# Patient Record
Sex: MALE | Race: OTHER | HISPANIC OR LATINO
[De-identification: names, ages, dates, MRNs, and addresses within clinical notes are randomized per-mention and may not be internally consistent; named-entity substitution may affect disease eponyms.]

---

## 2024-03-06 PROBLEM — Z00.00 ENCOUNTER FOR PREVENTIVE HEALTH EXAMINATION: Status: ACTIVE | Noted: 2024-03-06

## 2024-03-08 NOTE — PLAN
[FreeTextEntry1] :   I, Dr. Graves, personally performed the evaluation and management (E/M) services for this new patient.  That E/M includes conducting the initial examination, assessing all conditions, and establishing the plan of care.  Today, my ACP, Beth Beltran, was here to observe my evaluation and management services for this patient to be followed going forward.   Patient and patient's family verbalize agreement and understanding with plan of care.

## 2024-03-11 ENCOUNTER — APPOINTMENT (OUTPATIENT)
Dept: NEUROSURGERY | Facility: CLINIC | Age: 34
End: 2024-03-11
Payer: MEDICAID

## 2024-03-11 VITALS
OXYGEN SATURATION: 98 % | HEIGHT: 63 IN | BODY MASS INDEX: 29.23 KG/M2 | DIASTOLIC BLOOD PRESSURE: 80 MMHG | HEART RATE: 74 BPM | TEMPERATURE: 97.3 F | WEIGHT: 165 LBS | SYSTOLIC BLOOD PRESSURE: 119 MMHG | RESPIRATION RATE: 18 BRPM

## 2024-03-11 LAB
APTT BLD: 29.7 SEC
BASOPHILS # BLD AUTO: 0.03 K/UL
BASOPHILS NFR BLD AUTO: 0.7 %
EOSINOPHIL # BLD AUTO: 0.09 K/UL
EOSINOPHIL NFR BLD AUTO: 2 %
HCT VFR BLD CALC: 45.4 %
HGB BLD-MCNC: 14.7 G/DL
IMM GRANULOCYTES NFR BLD AUTO: 0.2 %
INR PPP: 1.07 RATIO
LYMPHOCYTES # BLD AUTO: 2.27 K/UL
LYMPHOCYTES NFR BLD AUTO: 50.6 %
MAN DIFF?: NORMAL
MCHC RBC-ENTMCNC: 30.6 PG
MCHC RBC-ENTMCNC: 32.4 GM/DL
MCV RBC AUTO: 94.4 FL
MONOCYTES # BLD AUTO: 0.4 K/UL
MONOCYTES NFR BLD AUTO: 8.9 %
NEUTROPHILS # BLD AUTO: 1.69 K/UL
NEUTROPHILS NFR BLD AUTO: 37.6 %
PLATELET # BLD AUTO: 292 K/UL
PT BLD: 12.1 SEC
RBC # BLD: 4.81 M/UL
RBC # FLD: 12 %
WBC # FLD AUTO: 4.49 K/UL

## 2024-03-11 PROCEDURE — 99205 OFFICE O/P NEW HI 60 MIN: CPT

## 2024-03-12 ENCOUNTER — APPOINTMENT (OUTPATIENT)
Dept: OTOLARYNGOLOGY | Facility: CLINIC | Age: 34
End: 2024-03-12
Payer: MEDICAID

## 2024-03-12 VITALS
DIASTOLIC BLOOD PRESSURE: 88 MMHG | SYSTOLIC BLOOD PRESSURE: 143 MMHG | WEIGHT: 153 LBS | HEIGHT: 63 IN | HEART RATE: 83 BPM | BODY MASS INDEX: 27.11 KG/M2

## 2024-03-12 PROCEDURE — 31231 NASAL ENDOSCOPY DX: CPT | Mod: 52

## 2024-03-12 PROCEDURE — 99204 OFFICE O/P NEW MOD 45 MIN: CPT | Mod: 25

## 2024-03-12 RX ORDER — RIZATRIPTAN BENZOATE 10 MG/1
TABLET ORAL
Refills: 0 | Status: ACTIVE | COMMUNITY

## 2024-03-12 RX ORDER — METFORMIN HYDROCHLORIDE 500 MG/1
500 TABLET, COATED ORAL
Refills: 0 | Status: ACTIVE | COMMUNITY

## 2024-03-12 RX ORDER — ATORVASTATIN CALCIUM 10 MG/1
10 TABLET, FILM COATED ORAL
Refills: 0 | Status: ACTIVE | COMMUNITY

## 2024-03-12 RX ORDER — ONDANSETRON 4 MG/1
4 TABLET ORAL
Refills: 0 | Status: ACTIVE | COMMUNITY

## 2024-03-12 RX ORDER — DICLOFENAC SODIUM 1% 10 MG/G
1 GEL TOPICAL
Refills: 0 | Status: ACTIVE | COMMUNITY

## 2024-03-12 NOTE — PHYSICAL EXAM
[Nasal Endoscopy Performed] : nasal endoscopy was performed, see procedure section for findings [Midline] : trachea located in midline position [Normal] : no rashes [FreeTextEntry2] : acromegalic facies

## 2024-03-12 NOTE — PHYSICAL EXAM
[General Appearance - Alert] : alert [General Appearance - In No Acute Distress] : in no acute distress [Oriented To Time, Place, And Person] : oriented to person, place, and time [Abnormal Walk] : normal gait [Balance] : balance was intact [Sclera] : the sclera and conjunctiva were normal

## 2024-03-12 NOTE — ASSESSMENT
[FreeTextEntry1] : 33M referred by Dr. Graves for GH-secreting pituitary adenoma found on MRI in setting of headaches. He has noticed enlargement of his hands/feet and acromegalic features. There is baseline nasal congestion/obstruction. MRI sella shows a right-sided pituitary macroadenoma measuring 1.5 x 1.2 x 1.5 cm which exhibits encroachment of the right cavernous sinus and suprasellar extension. On exam, there are obvious acromegalic features. Nasal endoscopy shows right septal deviation, inferior turbinate hypertrophy and sinonasal mucosal erythema. He has a GH-secreting pituitary adenoma. I had a discussion w the patient about my role in surgical resection, which includes the approach to the sella and reconstruction thereafter. I reviewed risks and benefits and possibility for CSF leak and methods of repair. I also reviewed expected postoperative course. All questions answered and he wishes to proceed as planned. Will get CT sinus preop.

## 2024-03-12 NOTE — ASSESSMENT
[FreeTextEntry1] : MRI brain with and without contrast on 12/12/23 reviewed by Dr. Graves with patient and family member showing pituitary macroadenoma. Discussed need for surgical resection. Will send him for bloodwork today. Will set him up with Dr. Vega tomorrow. Tentative surgery date 4/10/24.  I, Dr. Graves, personally performed the evaluation and management (E/M) services for this new patient.  That E/M includes conducting the initial examination, assessing all conditions, and establishing the plan of care.  Today, my ACP, Beth Beltran, was here to observe my evaluation and management services for this patient to be followed going forward.  Patient and patient's family verbalize agreement and understanding with plan of care.

## 2024-03-12 NOTE — PROCEDURE
[FreeTextEntry3] : Nasal Endoscopy: right septal deviation inferior turbinate hypertrophy sinonasal mucosal erythema

## 2024-03-12 NOTE — DATA REVIEWED
[de-identified] : MRI Sella 12/12/23: FINDINGS:  There is a hypoenhancing lesion within the right aspect of the pituitary gland measuring 1.5 x 1.2 x 1.5 cm (series 8 image 7, series 7 image 7), consistent with pituitary macroadenoma. There is leftward deviation of the pituitary infundibulum. The lesion encroaches upon the right cavernous sinus. There is suprasellar extension without compression of the optic chiasm.  Volumetric FLAIR images of the brain demonstrate a few scattered punctate foci of T2 prolongation in the supratentorial white matter, nonspecific and can be seen in the setting of migraine headaches, chronic microvascular ischemic or inflammatory demyelination. There is nasal turbinate hypertrophy and mild scattered ethmoid air cell mucosal thickening.  IMPRESSION:    Right-sided pituitary macroadenoma measuring 1.5 x 1.2 x 1.5 cm exhibits encroachment of the right cavernous sinus and suprasellar extension, without compression of the optic chiasm.

## 2024-03-12 NOTE — CONSULT LETTER
[Dear  ___] : Dear  [unfilled], [Courtesy Letter:] : I had the pleasure of seeing your patient, [unfilled], in my office today. [Consult Closing:] : Thank you very much for allowing me to participate in the care of this patient.  If you have any questions, please do not hesitate to contact me. [Sincerely,] : Sincerely, [DrMasha  ___] : Dr. LYON [DrMasha ___] : Dr. LYON [FreeTextEntry3] : Darci Vega MD  Department of Otolaryngology, Head and Neck Surgery

## 2024-03-12 NOTE — HISTORY OF PRESENT ILLNESS
[de-identified] : 33M here for initial evaluation.  Referred by Dr. Graves for GH-secreting pituitary adenoma found on MRI in setting of headaches. He has noticed enlargement of his hands/feet and acromegalic features.  He has baseline nasal congestion/obstruction.  MRI Sella 12/12/23: Right-sided pituitary macroadenoma measuring 1.5 x 1.2 x 1.5 cm exhibits encroachment of the right cavernous sinus and suprasellar extension, without compression of the optic chiasm.  ROS otherwise unremarkable.

## 2024-03-12 NOTE — HISTORY OF PRESENT ILLNESS
[de-identified] : Mr. Joseph is a 33 year old male with history of diabetes, HLD presenting with headaches. MRI with and without contrast performed on 12/12/23 showing a 1.5 x 1.2 x 1.5cm right sided pituitary macroadenoma. Presents today with enlargement of his hands/feet and acromegalic features. Having pain that is waking him up. Denies vision changes.

## 2024-03-15 LAB
ACTH-ESO: 69 PG/ML
ALBUMIN SERPL ELPH-MCNC: 5 G/DL
ALP BLD-CCNC: 89 U/L
ALT SERPL-CCNC: 32 U/L
ANION GAP SERPL CALC-SCNC: 12 MMOL/L
AST SERPL-CCNC: 19 U/L
BILIRUB SERPL-MCNC: 0.4 MG/DL
BUN SERPL-MCNC: 13 MG/DL
CALCIUM SERPL-MCNC: 10.4 MG/DL
CHLORIDE SERPL-SCNC: 100 MMOL/L
CO2 SERPL-SCNC: 29 MMOL/L
CORTIS SERPL-MCNC: 19.2 UG/DL
CREAT SERPL-MCNC: 0.94 MG/DL
EGFR: 108 ML/MIN/1.73M2
ESTIMATED AVERAGE GLUCOSE: 163 MG/DL
FSH SERPL-MCNC: 7.5 IU/L
GH SERPL-MCNC: 12.8 NG/ML
GLUCOSE SERPL-MCNC: 139 MG/DL
HBA1C MFR BLD HPLC: 7.3 %
IGF BP1 SERPL-MCNC: 1314 NG/ML
LH SERPL-ACNC: 2.1 IU/L
POTASSIUM SERPL-SCNC: 4.5 MMOL/L
PROLACTIN SERPL-MCNC: 8.1 NG/ML
PROT SERPL-MCNC: 7.9 G/DL
SODIUM SERPL-SCNC: 141 MMOL/L
T3FREE SERPL-MCNC: 3.68 PG/ML
T4 FREE SERPL-MCNC: 1.4 NG/DL
TESTOST SERPL-MCNC: 103 NG/DL
TSH SERPL-ACNC: 2.35 UIU/ML

## 2024-03-21 ENCOUNTER — OUTPATIENT (OUTPATIENT)
Dept: OUTPATIENT SERVICES | Facility: HOSPITAL | Age: 34
LOS: 1 days | End: 2024-03-21
Payer: MEDICAID

## 2024-03-21 ENCOUNTER — APPOINTMENT (OUTPATIENT)
Dept: CT IMAGING | Facility: HOSPITAL | Age: 34
End: 2024-03-21

## 2024-03-21 PROCEDURE — 70486 CT MAXILLOFACIAL W/O DYE: CPT | Mod: 26

## 2024-03-21 PROCEDURE — 70486 CT MAXILLOFACIAL W/O DYE: CPT

## 2024-04-09 ENCOUNTER — TRANSCRIPTION ENCOUNTER (OUTPATIENT)
Age: 34
End: 2024-04-09

## 2024-04-09 RX ORDER — SEMAGLUTIDE 0.68 MG/ML
0.5 INJECTION, SOLUTION SUBCUTANEOUS
Refills: 0 | DISCHARGE

## 2024-04-09 RX ORDER — POVIDONE-IODINE 5 %
1 AEROSOL (ML) TOPICAL ONCE
Refills: 0 | Status: COMPLETED | OUTPATIENT
Start: 2024-04-10 | End: 2024-04-10

## 2024-04-09 RX ORDER — METFORMIN HYDROCHLORIDE 850 MG/1
1 TABLET ORAL
Refills: 0 | DISCHARGE

## 2024-04-09 NOTE — PATIENT PROFILE ADULT - FALL HARM RISK - CONCLUSION
Denies area of redness to skin where it's tender, states lower part of shins are red/\"waxy\" in color but have been that way for years.    Per message yesterday, placed referral to ortho.   Universal Safety Interventions

## 2024-04-09 NOTE — PATIENT PROFILE ADULT - FALL HARM RISK - UNIVERSAL INTERVENTIONS
Bed in lowest position, wheels locked, appropriate side rails in place/Call bell, personal items and telephone in reach/Instruct patient to call for assistance before getting out of bed or chair/Non-slip footwear when patient is out of bed/[place] to call system/Physically safe environment - no spills, clutter or unnecessary equipment/Purposeful Proactive Rounding/Room/bathroom lighting operational, light cord in reach

## 2024-04-09 NOTE — PATIENT PROFILE ADULT - FUNCTIONAL ASSESSMENT - DAILY ACTIVITY ASSESSMENT TYPE
Received a DENIAL from [facility] for eszopiclone (LUNESTA) 1 MG tablet.   Scanned in Epic.    I did receive a call from [facility] and verified w/ Oncology and pharmacy that patient can take 2MG once daily to meet the quantity limits   Admission

## 2024-04-10 ENCOUNTER — APPOINTMENT (OUTPATIENT)
Dept: NEUROSURGERY | Facility: HOSPITAL | Age: 34
End: 2024-04-10

## 2024-04-10 ENCOUNTER — INPATIENT (INPATIENT)
Facility: HOSPITAL | Age: 34
LOS: 2 days | Discharge: HOME | DRG: 829 | End: 2024-04-13
Attending: NEUROLOGICAL SURGERY | Admitting: NEUROLOGICAL SURGERY
Payer: MEDICAID

## 2024-04-10 ENCOUNTER — RESULT REVIEW (OUTPATIENT)
Age: 34
End: 2024-04-10

## 2024-04-10 ENCOUNTER — APPOINTMENT (OUTPATIENT)
Dept: OTOLARYNGOLOGY | Facility: HOSPITAL | Age: 34
End: 2024-04-10

## 2024-04-10 VITALS
TEMPERATURE: 97 F | SYSTOLIC BLOOD PRESSURE: 144 MMHG | RESPIRATION RATE: 16 BRPM | HEART RATE: 85 BPM | DIASTOLIC BLOOD PRESSURE: 81 MMHG | OXYGEN SATURATION: 99 % | WEIGHT: 166.01 LBS | HEIGHT: 63 IN

## 2024-04-10 DIAGNOSIS — E11.65 TYPE 2 DIABETES MELLITUS WITH HYPERGLYCEMIA: ICD-10-CM

## 2024-04-10 DIAGNOSIS — Z79.85 LONG-TERM (CURRENT) USE OF INJECTABLE NON-INSULIN ANTIDIABETIC DRUGS: ICD-10-CM

## 2024-04-10 DIAGNOSIS — D3A.8 OTHER BENIGN NEUROENDOCRINE TUMORS: ICD-10-CM

## 2024-04-10 DIAGNOSIS — Z79.899 OTHER LONG TERM (CURRENT) DRUG THERAPY: ICD-10-CM

## 2024-04-10 DIAGNOSIS — G96.09 OTHER SPINAL CEREBROSPINAL FLUID LEAK: ICD-10-CM

## 2024-04-10 DIAGNOSIS — E22.0 ACROMEGALY AND PITUITARY GIGANTISM: ICD-10-CM

## 2024-04-10 DIAGNOSIS — D35.2 BENIGN NEOPLASM OF PITUITARY GLAND: ICD-10-CM

## 2024-04-10 DIAGNOSIS — K21.9 GASTRO-ESOPHAGEAL REFLUX DISEASE WITHOUT ESOPHAGITIS: ICD-10-CM

## 2024-04-10 DIAGNOSIS — Z79.84 LONG TERM (CURRENT) USE OF ORAL HYPOGLYCEMIC DRUGS: ICD-10-CM

## 2024-04-10 DIAGNOSIS — E78.5 HYPERLIPIDEMIA, UNSPECIFIED: ICD-10-CM

## 2024-04-10 LAB
A1C WITH ESTIMATED AVERAGE GLUCOSE RESULT: 7.9 % — HIGH (ref 4–5.6)
ADD ON TEST-SPECIMEN IN LAB: SIGNIFICANT CHANGE UP
ALBUMIN SERPL ELPH-MCNC: 4.2 G/DL — SIGNIFICANT CHANGE UP (ref 3.3–5)
ALP SERPL-CCNC: 83 U/L — SIGNIFICANT CHANGE UP (ref 40–120)
ALT FLD-CCNC: 17 U/L — SIGNIFICANT CHANGE UP (ref 10–45)
ANION GAP SERPL CALC-SCNC: 7 MMOL/L — SIGNIFICANT CHANGE UP (ref 5–17)
APTT BLD: 29.3 SEC — SIGNIFICANT CHANGE UP (ref 24.5–35.6)
AST SERPL-CCNC: 14 U/L — SIGNIFICANT CHANGE UP (ref 10–40)
BASE EXCESS BLDA CALC-SCNC: -2 MMOL/L — SIGNIFICANT CHANGE UP (ref -2–3)
BASE EXCESS BLDA CALC-SCNC: -2.1 MMOL/L — LOW (ref -2–3)
BASE EXCESS BLDA CALC-SCNC: -2.2 MMOL/L — LOW (ref -2–3)
BILIRUB SERPL-MCNC: 0.2 MG/DL — SIGNIFICANT CHANGE UP (ref 0.2–1.2)
BLD GP AB SCN SERPL QL: NEGATIVE — SIGNIFICANT CHANGE UP
BUN SERPL-MCNC: 12 MG/DL — SIGNIFICANT CHANGE UP (ref 7–23)
CA-I BLDA-SCNC: 1.2 MMOL/L — SIGNIFICANT CHANGE UP (ref 1.15–1.33)
CA-I BLDA-SCNC: 1.21 MMOL/L — SIGNIFICANT CHANGE UP (ref 1.15–1.33)
CA-I BLDA-SCNC: 1.23 MMOL/L — SIGNIFICANT CHANGE UP (ref 1.15–1.33)
CALCIUM SERPL-MCNC: 9.2 MG/DL — SIGNIFICANT CHANGE UP (ref 8.4–10.5)
CHLORIDE SERPL-SCNC: 103 MMOL/L — SIGNIFICANT CHANGE UP (ref 96–108)
CO2 BLDA-SCNC: 24 MMOL/L — SIGNIFICANT CHANGE UP (ref 19–24)
CO2 SERPL-SCNC: 25 MMOL/L — SIGNIFICANT CHANGE UP (ref 22–31)
COHGB MFR BLDA: 1.7 % — SIGNIFICANT CHANGE UP
COHGB MFR BLDA: 1.8 % — SIGNIFICANT CHANGE UP
COHGB MFR BLDA: 1.9 % — SIGNIFICANT CHANGE UP
CREAT SERPL-MCNC: 0.76 MG/DL — SIGNIFICANT CHANGE UP (ref 0.5–1.3)
EGFR: 120 ML/MIN/1.73M2 — SIGNIFICANT CHANGE UP
ESTIMATED AVERAGE GLUCOSE: 180 MG/DL — HIGH (ref 68–114)
GLUCOSE BLDA-MCNC: 295 MG/DL — HIGH (ref 70–99)
GLUCOSE BLDA-MCNC: 320 MG/DL — HIGH (ref 70–99)
GLUCOSE BLDA-MCNC: 345 MG/DL — HIGH (ref 70–99)
GLUCOSE BLDC GLUCOMTR-MCNC: 110 MG/DL — HIGH (ref 70–99)
GLUCOSE BLDC GLUCOMTR-MCNC: 128 MG/DL — HIGH (ref 70–99)
GLUCOSE BLDC GLUCOMTR-MCNC: 128 MG/DL — HIGH (ref 70–99)
GLUCOSE BLDC GLUCOMTR-MCNC: 142 MG/DL — HIGH (ref 70–99)
GLUCOSE BLDC GLUCOMTR-MCNC: 158 MG/DL — HIGH (ref 70–99)
GLUCOSE BLDC GLUCOMTR-MCNC: 159 MG/DL — HIGH (ref 70–99)
GLUCOSE BLDC GLUCOMTR-MCNC: 162 MG/DL — HIGH (ref 70–99)
GLUCOSE BLDC GLUCOMTR-MCNC: 219 MG/DL — HIGH (ref 70–99)
GLUCOSE BLDC GLUCOMTR-MCNC: 219 MG/DL — HIGH (ref 70–99)
GLUCOSE BLDC GLUCOMTR-MCNC: 244 MG/DL — HIGH (ref 70–99)
GLUCOSE BLDC GLUCOMTR-MCNC: 247 MG/DL — HIGH (ref 70–99)
GLUCOSE BLDC GLUCOMTR-MCNC: 300 MG/DL — HIGH (ref 70–99)
GLUCOSE SERPL-MCNC: 310 MG/DL — HIGH (ref 70–99)
HCO3 BLDA-SCNC: 23 MMOL/L — SIGNIFICANT CHANGE UP (ref 21–28)
HCT VFR BLD CALC: 37.3 % — LOW (ref 39–50)
HGB BLD-MCNC: 12.3 G/DL — LOW (ref 13–17)
HGB BLDA-MCNC: 10.4 G/DL — LOW (ref 12.6–17.4)
HGB BLDA-MCNC: 10.7 G/DL — LOW (ref 12.6–17.4)
HGB BLDA-MCNC: 13 G/DL — SIGNIFICANT CHANGE UP (ref 12.6–17.4)
INR BLD: 1.21 — HIGH (ref 0.85–1.18)
MAGNESIUM SERPL-MCNC: 1.8 MG/DL — SIGNIFICANT CHANGE UP (ref 1.6–2.6)
MCHC RBC-ENTMCNC: 30.8 PG — SIGNIFICANT CHANGE UP (ref 27–34)
MCHC RBC-ENTMCNC: 33 GM/DL — SIGNIFICANT CHANGE UP (ref 32–36)
MCV RBC AUTO: 93.3 FL — SIGNIFICANT CHANGE UP (ref 80–100)
METHGB MFR BLDA: 0.6 % — SIGNIFICANT CHANGE UP
METHGB MFR BLDA: 0.7 % — SIGNIFICANT CHANGE UP
METHGB MFR BLDA: 0.8 % — SIGNIFICANT CHANGE UP
NRBC # BLD: 0 /100 WBCS — SIGNIFICANT CHANGE UP (ref 0–0)
OXYHGB MFR BLDA: 96.7 % — HIGH (ref 90–95)
OXYHGB MFR BLDA: 96.8 % — HIGH (ref 90–95)
OXYHGB MFR BLDA: 97.7 % — HIGH (ref 90–95)
PCO2 BLDA: 40 MMHG — SIGNIFICANT CHANGE UP (ref 35–48)
PCO2 BLDA: 41 MMHG — SIGNIFICANT CHANGE UP (ref 35–48)
PCO2 BLDA: 41 MMHG — SIGNIFICANT CHANGE UP (ref 35–48)
PH BLDA: 7.36 — SIGNIFICANT CHANGE UP (ref 7.35–7.45)
PH BLDA: 7.36 — SIGNIFICANT CHANGE UP (ref 7.35–7.45)
PH BLDA: 7.37 — SIGNIFICANT CHANGE UP (ref 7.35–7.45)
PHOSPHATE SERPL-MCNC: 3.8 MG/DL — SIGNIFICANT CHANGE UP (ref 2.5–4.5)
PLATELET # BLD AUTO: 258 K/UL — SIGNIFICANT CHANGE UP (ref 150–400)
PO2 BLDA: 101 MMHG — SIGNIFICANT CHANGE UP (ref 83–108)
PO2 BLDA: 106 MMHG — SIGNIFICANT CHANGE UP (ref 83–108)
PO2 BLDA: 293 MMHG — HIGH (ref 83–108)
POTASSIUM BLDA-SCNC: 3.8 MMOL/L — SIGNIFICANT CHANGE UP (ref 3.5–5.1)
POTASSIUM BLDA-SCNC: 4 MMOL/L — SIGNIFICANT CHANGE UP (ref 3.5–5.1)
POTASSIUM BLDA-SCNC: 4.2 MMOL/L — SIGNIFICANT CHANGE UP (ref 3.5–5.1)
POTASSIUM SERPL-MCNC: 4.5 MMOL/L — SIGNIFICANT CHANGE UP (ref 3.5–5.3)
POTASSIUM SERPL-SCNC: 4.5 MMOL/L — SIGNIFICANT CHANGE UP (ref 3.5–5.3)
PROT SERPL-MCNC: 6.8 G/DL — SIGNIFICANT CHANGE UP (ref 6–8.3)
PROTHROM AB SERPL-ACNC: 13.7 SEC — HIGH (ref 9.5–13)
RBC # BLD: 4 M/UL — LOW (ref 4.2–5.8)
RBC # FLD: 11.5 % — SIGNIFICANT CHANGE UP (ref 10.3–14.5)
RH IG SCN BLD-IMP: POSITIVE — SIGNIFICANT CHANGE UP
SAO2 % BLDA: 100 % — HIGH (ref 94–98)
SAO2 % BLDA: 99.3 % — HIGH (ref 94–98)
SAO2 % BLDA: 99.4 % — HIGH (ref 94–98)
SODIUM BLDA-SCNC: 133 MMOL/L — LOW (ref 136–145)
SODIUM BLDA-SCNC: 134 MMOL/L — LOW (ref 136–145)
SODIUM BLDA-SCNC: 135 MMOL/L — LOW (ref 136–145)
SODIUM SERPL-SCNC: 135 MMOL/L — SIGNIFICANT CHANGE UP (ref 135–145)
WBC # BLD: 5.56 K/UL — SIGNIFICANT CHANGE UP (ref 3.8–10.5)
WBC # FLD AUTO: 5.56 K/UL — SIGNIFICANT CHANGE UP (ref 3.8–10.5)

## 2024-04-10 PROCEDURE — 88341 IMHCHEM/IMCYTCHM EA ADD ANTB: CPT | Mod: 26

## 2024-04-10 PROCEDURE — 61782 SCAN PROC CRANIAL EXTRA: CPT

## 2024-04-10 PROCEDURE — 88360 TUMOR IMMUNOHISTOCHEM/MANUAL: CPT | Mod: 26,1L,59

## 2024-04-10 PROCEDURE — 70552 MRI BRAIN STEM W/DYE: CPT | Mod: 26

## 2024-04-10 PROCEDURE — 99291 CRITICAL CARE FIRST HOUR: CPT

## 2024-04-10 PROCEDURE — 62165 REMOVE PITUIT TUMOR W/SCOPE: CPT | Mod: 62

## 2024-04-10 PROCEDURE — 88342 IMHCHEM/IMCYTCHM 1ST ANTB: CPT | Mod: 26

## 2024-04-10 PROCEDURE — 61781 SCAN PROC CRANIAL INTRA: CPT

## 2024-04-10 PROCEDURE — 88331 PATH CONSLTJ SURG 1 BLK 1SPC: CPT | Mod: 26

## 2024-04-10 PROCEDURE — 88305 TISSUE EXAM BY PATHOLOGIST: CPT | Mod: 26

## 2024-04-10 PROCEDURE — 88334 PATH CONSLTJ SURG CYTO XM EA: CPT | Mod: 26,59

## 2024-04-10 PROCEDURE — 15769 GRFG AUTOL SOFT TISS DIR EXC: CPT

## 2024-04-10 DEVICE — MATRIX DURAGEN PLUS DURAL REGENERATION 3X3: Type: IMPLANTABLE DEVICE | Status: FUNCTIONAL

## 2024-04-10 DEVICE — DVC ADHERUS AUTOSPRAY W/ DURAL SEALANT EXT TIP: Type: IMPLANTABLE DEVICE | Status: FUNCTIONAL

## 2024-04-10 DEVICE — SURGCEL 4 X 8": Type: IMPLANTABLE DEVICE | Status: FUNCTIONAL

## 2024-04-10 DEVICE — SURGIFLO HEMOSTATIC MATRIX KIT: Type: IMPLANTABLE DEVICE | Status: FUNCTIONAL

## 2024-04-10 DEVICE — SURGIFOAM PAD 8CM X 12.5CM X 10MM (100): Type: IMPLANTABLE DEVICE | Status: FUNCTIONAL

## 2024-04-10 RX ORDER — PANTOPRAZOLE SODIUM 20 MG/1
40 TABLET, DELAYED RELEASE ORAL
Refills: 0 | Status: DISCONTINUED | OUTPATIENT
Start: 2024-04-10 | End: 2024-04-13

## 2024-04-10 RX ORDER — HYDROCORTISONE 20 MG
50 TABLET ORAL EVERY 8 HOURS
Refills: 0 | Status: COMPLETED | OUTPATIENT
Start: 2024-04-10 | End: 2024-04-11

## 2024-04-10 RX ORDER — POLYETHYLENE GLYCOL 3350 17 G/17G
17 POWDER, FOR SOLUTION ORAL DAILY
Refills: 0 | Status: DISCONTINUED | OUTPATIENT
Start: 2024-04-10 | End: 2024-04-13

## 2024-04-10 RX ORDER — MAGNESIUM SULFATE 500 MG/ML
2 VIAL (ML) INJECTION ONCE
Refills: 0 | Status: COMPLETED | OUTPATIENT
Start: 2024-04-10 | End: 2024-04-10

## 2024-04-10 RX ORDER — INSULIN LISPRO 100/ML
VIAL (ML) SUBCUTANEOUS
Refills: 0 | Status: DISCONTINUED | OUTPATIENT
Start: 2024-04-10 | End: 2024-04-11

## 2024-04-10 RX ORDER — CEFTRIAXONE 500 MG/1
1000 INJECTION, POWDER, FOR SOLUTION INTRAMUSCULAR; INTRAVENOUS EVERY 24 HOURS
Refills: 0 | Status: DISCONTINUED | OUTPATIENT
Start: 2024-04-10 | End: 2024-04-11

## 2024-04-10 RX ORDER — CYCLOBENZAPRINE HYDROCHLORIDE 10 MG/1
1 TABLET, FILM COATED ORAL
Refills: 0 | DISCHARGE

## 2024-04-10 RX ORDER — OXYCODONE HYDROCHLORIDE 5 MG/1
5 TABLET ORAL EVERY 4 HOURS
Refills: 0 | Status: DISCONTINUED | OUTPATIENT
Start: 2024-04-10 | End: 2024-04-13

## 2024-04-10 RX ORDER — RACEPINEPHRINE HCL 2.25 %
1 SOLUTION, NON-ORAL TOPICAL
Refills: 0 | Status: DISCONTINUED | OUTPATIENT
Start: 2024-04-10 | End: 2024-04-10

## 2024-04-10 RX ORDER — ONDANSETRON 8 MG/1
4 TABLET, FILM COATED ORAL EVERY 6 HOURS
Refills: 0 | Status: COMPLETED | OUTPATIENT
Start: 2024-04-10 | End: 2024-04-12

## 2024-04-10 RX ORDER — INSULIN LISPRO 100/ML
5 VIAL (ML) SUBCUTANEOUS
Refills: 0 | Status: DISCONTINUED | OUTPATIENT
Start: 2024-04-11 | End: 2024-04-11

## 2024-04-10 RX ORDER — PANTOPRAZOLE SODIUM 20 MG/1
40 TABLET, DELAYED RELEASE ORAL
Refills: 0 | Status: DISCONTINUED | OUTPATIENT
Start: 2024-04-10 | End: 2024-04-10

## 2024-04-10 RX ORDER — DEXTROSE 50 % IN WATER 50 %
25 SYRINGE (ML) INTRAVENOUS ONCE
Refills: 0 | Status: DISCONTINUED | OUTPATIENT
Start: 2024-04-10 | End: 2024-04-11

## 2024-04-10 RX ORDER — OXYCODONE HYDROCHLORIDE 5 MG/1
10 TABLET ORAL EVERY 4 HOURS
Refills: 0 | Status: DISCONTINUED | OUTPATIENT
Start: 2024-04-10 | End: 2024-04-13

## 2024-04-10 RX ORDER — SENNA PLUS 8.6 MG/1
2 TABLET ORAL AT BEDTIME
Refills: 0 | Status: DISCONTINUED | OUTPATIENT
Start: 2024-04-10 | End: 2024-04-13

## 2024-04-10 RX ORDER — DEXTROSE 50 % IN WATER 50 %
12.5 SYRINGE (ML) INTRAVENOUS ONCE
Refills: 0 | Status: DISCONTINUED | OUTPATIENT
Start: 2024-04-10 | End: 2024-04-11

## 2024-04-10 RX ORDER — SODIUM CHLORIDE 9 MG/ML
1000 INJECTION INTRAMUSCULAR; INTRAVENOUS; SUBCUTANEOUS
Refills: 0 | Status: DISCONTINUED | OUTPATIENT
Start: 2024-04-10 | End: 2024-04-10

## 2024-04-10 RX ORDER — INSULIN HUMAN 100 [IU]/ML
6 INJECTION, SOLUTION SUBCUTANEOUS
Qty: 100 | Refills: 0 | Status: DISCONTINUED | OUTPATIENT
Start: 2024-04-10 | End: 2024-04-11

## 2024-04-10 RX ORDER — ACETAMINOPHEN 500 MG
1000 TABLET ORAL ONCE
Refills: 0 | Status: COMPLETED | OUTPATIENT
Start: 2024-04-10 | End: 2024-04-10

## 2024-04-10 RX ORDER — INSULIN GLARGINE 100 [IU]/ML
15 INJECTION, SOLUTION SUBCUTANEOUS AT BEDTIME
Refills: 0 | Status: DISCONTINUED | OUTPATIENT
Start: 2024-04-10 | End: 2024-04-13

## 2024-04-10 RX ORDER — ATORVASTATIN CALCIUM 80 MG/1
10 TABLET, FILM COATED ORAL AT BEDTIME
Refills: 0 | Status: DISCONTINUED | OUTPATIENT
Start: 2024-04-10 | End: 2024-04-13

## 2024-04-10 RX ORDER — FLUTICASONE PROPIONATE 50 MCG
1 SPRAY, SUSPENSION NASAL
Refills: 0 | DISCHARGE

## 2024-04-10 RX ORDER — ACETAMINOPHEN 500 MG
2 TABLET ORAL
Refills: 0 | DISCHARGE

## 2024-04-10 RX ORDER — SODIUM CHLORIDE 9 MG/ML
1000 INJECTION, SOLUTION INTRAVENOUS
Refills: 0 | Status: DISCONTINUED | OUTPATIENT
Start: 2024-04-10 | End: 2024-04-10

## 2024-04-10 RX ORDER — BENZOCAINE AND MENTHOL 5; 1 G/100ML; G/100ML
1 LIQUID ORAL EVERY 6 HOURS
Refills: 0 | Status: DISCONTINUED | OUTPATIENT
Start: 2024-04-10 | End: 2024-04-13

## 2024-04-10 RX ORDER — SODIUM CHLORIDE 0.65 %
1 AEROSOL, SPRAY (ML) NASAL DAILY
Refills: 0 | Status: DISCONTINUED | OUTPATIENT
Start: 2024-04-11 | End: 2024-04-11

## 2024-04-10 RX ORDER — ACETAMINOPHEN 500 MG
1000 TABLET ORAL EVERY 8 HOURS
Refills: 0 | Status: DISCONTINUED | OUTPATIENT
Start: 2024-04-10 | End: 2024-04-12

## 2024-04-10 RX ORDER — OMEPRAZOLE 10 MG/1
1 CAPSULE, DELAYED RELEASE ORAL
Refills: 0 | DISCHARGE

## 2024-04-10 RX ORDER — CHLORHEXIDINE GLUCONATE 213 G/1000ML
1 SOLUTION TOPICAL EVERY 12 HOURS
Refills: 0 | Status: DISCONTINUED | OUTPATIENT
Start: 2024-04-10 | End: 2024-04-10

## 2024-04-10 RX ORDER — ATORVASTATIN CALCIUM 80 MG/1
1 TABLET, FILM COATED ORAL
Refills: 0 | DISCHARGE

## 2024-04-10 RX ADMIN — Medication 25 GRAM(S): at 13:31

## 2024-04-10 RX ADMIN — CHLORHEXIDINE GLUCONATE 1 APPLICATION(S): 213 SOLUTION TOPICAL at 06:31

## 2024-04-10 RX ADMIN — ONDANSETRON 4 MILLIGRAM(S): 8 TABLET, FILM COATED ORAL at 17:13

## 2024-04-10 RX ADMIN — Medication 1 APPLICATION(S): at 07:08

## 2024-04-10 RX ADMIN — Medication 50 MILLIGRAM(S): at 16:32

## 2024-04-10 RX ADMIN — Medication 50 MILLIGRAM(S): at 22:26

## 2024-04-10 RX ADMIN — Medication 1000 MILLIGRAM(S): at 22:25

## 2024-04-10 RX ADMIN — Medication 1000 MILLIGRAM(S): at 07:07

## 2024-04-10 RX ADMIN — SODIUM CHLORIDE 40 MILLILITER(S): 9 INJECTION, SOLUTION INTRAVENOUS at 18:44

## 2024-04-10 RX ADMIN — ATORVASTATIN CALCIUM 10 MILLIGRAM(S): 80 TABLET, FILM COATED ORAL at 22:27

## 2024-04-10 RX ADMIN — BENZOCAINE AND MENTHOL 1 LOZENGE: 5; 1 LIQUID ORAL at 21:05

## 2024-04-10 RX ADMIN — SENNA PLUS 2 TABLET(S): 8.6 TABLET ORAL at 22:26

## 2024-04-10 RX ADMIN — Medication 1000 MILLIGRAM(S): at 13:51

## 2024-04-10 RX ADMIN — CEFTRIAXONE 100 MILLIGRAM(S): 500 INJECTION, POWDER, FOR SOLUTION INTRAMUSCULAR; INTRAVENOUS at 12:26

## 2024-04-10 RX ADMIN — Medication 400 MILLIGRAM(S): at 13:07

## 2024-04-10 RX ADMIN — INSULIN HUMAN 6 UNIT(S)/HR: 100 INJECTION, SOLUTION SUBCUTANEOUS at 13:07

## 2024-04-10 RX ADMIN — Medication 2: at 22:24

## 2024-04-10 RX ADMIN — Medication 1000 MILLIGRAM(S): at 23:40

## 2024-04-10 RX ADMIN — INSULIN GLARGINE 15 UNIT(S): 100 INJECTION, SOLUTION SUBCUTANEOUS at 22:25

## 2024-04-10 NOTE — CONSULT NOTE ADULT - SUBJECTIVE AND OBJECTIVE BOX
HISTORY OF PRESENT ILLNESS  ZIA GIBBS is a 33y Male with a past medical history of       IGFT-1 1314, ACTH 69, GH 12.80, TSH 2.35, FSH 7.5, prolactin 8.1, LH 2.1. total testosterone 103, cortisol PM 19.2, a1c 7.1    MRI with and without contrast performed on 12/12/23 showing a 1.5 x 1.2 x 1.5cm right sided pituitary macroadenoma.    Endocrinology has been consulted for postoperative care of pituitary surgery    DIABETES HISTORY  - Age at diagnosis:   - Diabetes managed outpatient by:  - Current Therapy:  - History of other regimens:   - History of hypoglycemia:   - History of DKA/HHS:   - Diabetic Complications:   - Home glucose readings:  - Diet:          > Breakfast:         > Lunch:        > Dinner:        > Snacks:    FAMILY HISTORY  - Diabetes:    SOCIAL HISTORY  - Work:  - Alcohol:  - Smoking:    ALLERGIES  No Known Allergies      CURRENT MEDICATIONS  acetaminophen     Tablet .. 1000 milliGRAM(s) Oral every 8 hours  atorvastatin 10 milliGRAM(s) Oral at bedtime  cefTRIAXone   IVPB 1000 milliGRAM(s) IV Intermittent every 24 hours  dextrose 50% Injectable 12.5 Gram(s) IV Push once  dextrose 50% Injectable 25 Gram(s) IV Push once  EPINEPHrine   1 mG/mL (1:1,000) Topical Solution 1 Application(s) Topical <User Schedule>  insulin regular Infusion 6 Unit(s)/Hr IV Continuous <Continuous>  magnesium sulfate  IVPB 2 Gram(s) IV Intermittent once  ondansetron Injectable 4 milliGRAM(s) IV Push every 6 hours  oxyCODONE    IR 5 milliGRAM(s) Oral every 4 hours PRN  oxyCODONE    IR 10 milliGRAM(s) Oral every 4 hours PRN  pantoprazole    Tablet 40 milliGRAM(s) Oral before breakfast  polyethylene glycol 3350 17 Gram(s) Oral daily  senna 2 Tablet(s) Oral at bedtime  sodium chloride 0.9%. 1000 milliLiter(s) IV Continuous <Continuous>      REVIEW OF SYSTEMS  Constitutional:  Negative fever, chills   Cardiovascular:  Negative for chest pain or palpitations.  Respiratory:  Negative for cough, wheezing, or shortness of breath.   Gastrointestinal:  Negative for nausea, vomiting, diarrhea, constipation, or abdominal pain.  Genitourinary:  Negative frequency, urgency or dysuria.  Neurologic:  No headache, confusion, dizziness    PHYSICAL EXAM  Vital Signs Last 24 Hrs  T(C): 37.1 (10 Apr 2024 12:00), Max: 37.1 (10 Apr 2024 12:00)  T(F): 98.8 (10 Apr 2024 12:00), Max: 98.8 (10 Apr 2024 12:00)  HR: 104 (10 Apr 2024 13:00) (85 - 104)  BP: 146/67 (10 Apr 2024 13:00) (142/71 - 150/68)  BP(mean): 96 (10 Apr 2024 13:00) (95 - 99)  RR: 14 (10 Apr 2024 12:30) (12 - 16)  SpO2: 97% (10 Apr 2024 13:00) (92% - 99%)    Parameters below as of 10 Apr 2024 13:00      O2 Concentration (%): 40  Constitutional: Awake, alert  HEENT: Normocephalic, atraumatic, AXEL  Neck: supple, no acanthosis, no thyromegaly or palpable thyroid nodules.  Respiratory: Lungs clear to ausculation bilaterally.   Cardiovascular: regular rhythm, normal S1 and S2, no audible murmurs.   GI: soft, non-tender, non-distended, bowel sounds present  Extremities: No lower extremity edema, peripheral pulses present.     LABS  CBC - WBC/HGB/HTC/PLT: 5.56/12.3/37.3/258 (04-10-24)  BMP: Na/K/Cl/Bicarb/BUN/Cr/Gluc: 135/4.5/103/25/12/0.76/310 (04-10-24)  Anion Gap: 7 (04-10-24)  eGFR: 120 (04-10-24)  Calcium: 9.2 (04-10-24)  Phosphorus: 3.8 (04-10-24)  Magnesium: 1.8 (04-10-24)  LFT - Alb/Tprot/Tbili/Dbili/AlkPhos/ALT/AST: 4.2/--/0.2/--/83/17/14 (04-10-24)  PT/aPTT/INR: 13.7/29.3/1.21 (04-10-24)    CBC - WBC/HGB/HTC/PLT: 5.56/12.3/37.3/258 (04-10-24)BMP: Na/K/Cl/Bicarb/BUN/Cr/Gluc: 135/4.5/103/25/12/0.76/310 (04-10-24)  Anion Gap: 7 (04-10-24)  eGFR: 120 (04-10-24)  Calcium: 9.2 (04-10-24)  Phosphorus: 3.8 (04-10-24)  Magnesium: 1.8 (04-10-24)    CAPILLARY BLOOD GLUCOSE & INSULIN RECEIVED  247 mg/dL (04-10 @ 06:51)  300 mg/dL (04-10 @ 11:49)        04-10-24 @ 07:01  -  04-10-24 @ 13:18  --------------------------------------------------------  IN: 150 mL / OUT: 275 mL / NET: -125 mL        ASSESSMENT / RECOMMENDATIONS    A1C: BUN: 12  Creatinine: 0.76  GFR: 120  Weight: 75.3  BMI: 29.4  EF:     # pituitary macroadenoma  s/p resection  - strict Is/Os  - monitor s/s of DI, if the urine output is increasing >150-200ml/hr for consecutive 2-3 hours and Na level >145, consider 1x DDAVP 0.25 mcg  - steroid taper per neurosurgery  - recheck hormone tomorrow    # Type 2 diabetes mellitus with hyperglycemia  - Please keep lantus   units at bedtime.   - Keep lispro   units before each meal.  - Continue lispro moderate dose sliding scale before meals and at bedtime.  - Patient's fingerstick glucose goal is 100-180 mg/dL.    - Discharge recommendations to be discussed.   - Patient can follow up at discharge with [facility] by calling [phone] to make an appointment.      Case discussed with Dr. Wallace. Primary team updated.       Ammy Rivas  Endocrinology Fellow    Service Pager: [phone]  HISTORY OF PRESENT ILLNESS  ZIA GIBBS is a 33y Male with a past medical history of acromegaly, type 2 DM is now s/p Endoscopic transphenoidal or transnasal resection of pituitary tumor on 04/10.    Pt was still drowsy during consultation from anesthesia.  Wife and friend by bedside who I obtained history from.  From 2022, pt was noted to have increasingly growing digits, width of nose, toes and teeth were enlarging.  He was also having persistent headaches.   Pt reportedly never saw endocrinologist.  He saw neurologist and found to have elevated hormones there were consistent with acromegaly.  Most recent labs showed IGFT-1 1314, ACTH 69, GH 12.80, TSH 2.35, FSH 7.5, prolactin 8.1, LH 2.1. total testosterone 103, cortisol PM 19.2, a1c 7.1    Most recent MRI with and without contrast performed on 12/12/23 showing a 1.5 x 1.2 x 1.5cm right sided pituitary macroadenoma.    Endocrinology has been consulted for postoperative care of pituitary surgery    DIABETES HISTORY  Pt takes metformin 500 bid, denies family history of diabetes.  Per family, the diet is healthy with minimal sweets and no soda drinking.  Wife said the sugars are mostly under 150s.  Managed by PCP    SOCIAL HISTORY  - Work:     ALLERGIES  No Known Allergies      CURRENT MEDICATIONS  acetaminophen     Tablet .. 1000 milliGRAM(s) Oral every 8 hours  atorvastatin 10 milliGRAM(s) Oral at bedtime  cefTRIAXone   IVPB 1000 milliGRAM(s) IV Intermittent every 24 hours  dextrose 50% Injectable 12.5 Gram(s) IV Push once  dextrose 50% Injectable 25 Gram(s) IV Push once  EPINEPHrine   1 mG/mL (1:1,000) Topical Solution 1 Application(s) Topical <User Schedule>  insulin regular Infusion 6 Unit(s)/Hr IV Continuous <Continuous>  magnesium sulfate  IVPB 2 Gram(s) IV Intermittent once  ondansetron Injectable 4 milliGRAM(s) IV Push every 6 hours  oxyCODONE    IR 5 milliGRAM(s) Oral every 4 hours PRN  oxyCODONE    IR 10 milliGRAM(s) Oral every 4 hours PRN  pantoprazole    Tablet 40 milliGRAM(s) Oral before breakfast  polyethylene glycol 3350 17 Gram(s) Oral daily  senna 2 Tablet(s) Oral at bedtime  sodium chloride 0.9%. 1000 milliLiter(s) IV Continuous <Continuous>      REVIEW OF SYSTEMS  limited    PHYSICAL EXAM  Vital Signs Last 24 Hrs  T(C): 37.1 (10 Apr 2024 12:00), Max: 37.1 (10 Apr 2024 12:00)  T(F): 98.8 (10 Apr 2024 12:00), Max: 98.8 (10 Apr 2024 12:00)  HR: 104 (10 Apr 2024 13:00) (85 - 104)  BP: 146/67 (10 Apr 2024 13:00) (142/71 - 150/68)  BP(mean): 96 (10 Apr 2024 13:00) (95 - 99)  RR: 14 (10 Apr 2024 12:30) (12 - 16)  SpO2: 97% (10 Apr 2024 13:00) (92% - 99%)    Parameters below as of 10 Apr 2024 13:00      O2 Concentration (%): 40  Constitutional: appeared drowsy  HEENT: Normocephalic, atraumatic, AXEL  Neck: supple, no acanthosis, no thyromegaly or palpable thyroid nodules.  Respiratory: Lungs clear to ausculation bilaterally.   Cardiovascular: regular rhythm, normal S1 and S2, no audible murmurs.   GI: soft, non-tender, non-distended, bowel sounds present  Extremities: No lower extremity edema, peripheral pulses present.     LABS  CBC - WBC/HGB/HTC/PLT: 5.56/12.3/37.3/258 (04-10-24)  BMP: Na/K/Cl/Bicarb/BUN/Cr/Gluc: 135/4.5/103/25/12/0.76/310 (04-10-24)  Anion Gap: 7 (04-10-24)  eGFR: 120 (04-10-24)  Calcium: 9.2 (04-10-24)  Phosphorus: 3.8 (04-10-24)  Magnesium: 1.8 (04-10-24)  LFT - Alb/Tprot/Tbili/Dbili/AlkPhos/ALT/AST: 4.2/--/0.2/--/83/17/14 (04-10-24)  PT/aPTT/INR: 13.7/29.3/1.21 (04-10-24)    CBC - WBC/HGB/HTC/PLT: 5.56/12.3/37.3/258 (04-10-24)BMP: Na/K/Cl/Bicarb/BUN/Cr/Gluc: 135/4.5/103/25/12/0.76/310 (04-10-24)  Anion Gap: 7 (04-10-24)  eGFR: 120 (04-10-24)  Calcium: 9.2 (04-10-24)  Phosphorus: 3.8 (04-10-24)  Magnesium: 1.8 (04-10-24)    CAPILLARY BLOOD GLUCOSE & INSULIN RECEIVED  247 mg/dL (04-10 @ 06:51)  300 mg/dL (04-10 @ 11:49)        04-10-24 @ 07:01  -  04-10-24 @ 13:18  --------------------------------------------------------  IN: 150 mL / OUT: 275 mL / NET: -125 mL        ASSESSMENT / RECOMMENDATIONS    ZIA GIBBS is a 33y Male with a past medical history of acromegaly, type 2 DM is now s/p Endoscopic transphenoidal or transnasal resection of pituitary tumor on 04/10.      A1C: BUN: 12  Creatinine: 0.76  GFR: 120  Weight: 75.3  BMI: 29.4  EF:     # pituitary macroadenoma  s/p resection  - strict Is/Os  - monitor s/s of DI, if the urine output is increasing >150-200ml/hr for consecutive 2-3 hours and Na level >145, consider 1x DDAVP 0.25 mcg  - would recommend steroid taper to IV hydrocortisone 50 q8 today   - will recheck hormones (IGF-1, GH, TSH, free T4, total testosterone) tomorrow    # Type 2 diabetes mellitus with hyperglycemia  - most a1c 7.1  - Please keep lantus 15   units at bedtime.   - Keep lispro  5  units before each meal.  - Continue lispro moderate dose sliding scale before meals and at bedtime.  - Patient's fingerstick glucose goal is 100-180 mg/dL.    - Discharge recommendations to be discussed.   - Patient can follow up at discharge with [facility] by calling [phone] to make an appointment.      Case discussed with Dr. Wallace. Primary team updated.       Ammy Rivas  Endocrinology Fellow    Service Pager: [phone]

## 2024-04-10 NOTE — PRE-OP CHECKLIST - PATIENT'S PERSONAL PROPERTY GIVEN TO
Caller: Addie Yates    Relationship: Self    Best call back number: [phone]    Requested Prescriptions:   Requested Prescriptions     Pending Prescriptions Disp Refills    meloxicam (MOBIC) 15 MG tablet       Sig: Take 1 tablet by mouth Daily.        Pharmacy where request should be sent: [facility] [place], [place] - [address] [facility] [address] - [phone]  - [phone] FX     Last office visit with prescribing clinician: 12/7/2023   Last telemedicine visit with prescribing clinician: Visit date not found   Next office visit with prescribing clinician: 3/12/2024     Additional details provided by patient:     Does the patient have less than a 3 day supply:  [x] Yes  [] No    Would you like a call back once the refill request has been completed: [] Yes [] No    If the office needs to give you a call back, can they leave a voicemail: [] Yes [] No    Gomez Gant Rep   02/26/24 12:26 EST          family member/on unit

## 2024-04-10 NOTE — H&P ADULT - ASSESSMENT
Mr. Joseph is a 33 year old male with history of diabetes, HLD presenting with headaches. MRI with and without contrast performed on 12/12/23 showing a 1.5 x 1.2 x 1.5cm right sided pituitary macroadenoma. Presents today with enlargement of his hands/feet and acromegalic features. Having pain that is waking him up. Denies vision changes. Patient is here now for endoscopic endonasal resection of pituitary mass.    -ICU for post-op care

## 2024-04-10 NOTE — BRIEF OPERATIVE NOTE - NSICDXBRIEFPOSTOP_GEN_ALL_CORE_FT
POST-OP DIAGNOSIS:  Pituitary neoplasm 10-Apr-2024 11:53:16  Renee Marcial  
POST-OP DIAGNOSIS:  Pituitary neoplasm 10-Apr-2024 11:53:16  Renee Marcial

## 2024-04-10 NOTE — PROGRESS NOTE ADULT - ASSESSMENT
33y/M with  s/p TSP resection   Diabetes Mellitus dyslipidemia  GERD  hay fever    PLAN:   NEURO:  REHAB:  physical therapy evaluation and management    EARLY MOB:      PULM:  Room air, incentive spirometry  CARDIO:  SBP goal 100-150mm Hg  ENDO:  Blood sugar goals 140-180 mg/dL, continue insulin sliding scale  GI:  PPI for GI prophylaxis  DIET:  RENAL:    HEM/ONC:  VTE Prophylaxis:     ID: afebrile, no leukocytosis  ====================   T(F): , Max: 98.8 (04-10-24 @ 12:00)    WBC Count: 5.56 (04-10)    ====================  Social: will update family    Active issues:  What's keeping patient in the ICU?  What is this patient's dispo plan?    ATTENDING ATTESTATION:  I was physically present for the key portions of the evaluation and management (E/M) service provided.  I agree with the above history, physical and plan, which I have reviewed and edited where appropriate.    Patient at high risk for neurological deterioration or death due to:  ICU delirium, aspiration PNA, DVT / PE.  Critical care time:  I have personally provided 45 minutes of critical care time, excluding time spent on separate procedures.      Plan discussed with RN, house staff. 33y/M with  acromegaly, R pituitary macroadenoma, s/p TSP resection (04/10/2024, Dr. Graves)  Diabetes Mellitus dyslipidemia  GERD  hay fever    PLAN:   NEURO: neurochecks q1h, PRN pain meds with oxycodone  WOF CSF leak; f/u histopathology, MRI out patient, steroid taper   bilateral Franco until outpatient f/u   DI watch  REHAB:  physical therapy evaluation and management    EARLY MOB:      PULM:  Room air, skull base precautions: no nose blowing, drinking with a straw, or bending below waist; NO incentive spirometry   CARDIO:  SBP goal 100-140mm Hg  ENDO:  Blood sugar goals 140-180 mg/dL, continue insulin sliding scale; atorvastatin 10; insulin glargine 15 units tonight, d/c insulin drip after 2 hours and D5 NS fluids; start lispro 5 units premeals tomorrow; hydrocortisone taper  GI:  PPI for GERD   DIET: CCD  RENAL:  IVL once off insulin drip, d/c Gandhi, if UO >250cc/hr x2 hours, send a DI work-up  HEM/ONC: Hb stable  VTE Prophylaxis: SCDs, no DVT chemoprophylaxis for now as patient is high risk for bleed (fresh post-op)  ID: afebrile, no leukocytosis; ceftriaxone while Franco splints  Social: will update family    Active issues:  What's keeping patient in the ICU? close neurochecks, DI watch, insulin drip  What is this patient's dispo plan? stepdown tomorrow, off insulin drip    ATTENDING ATTESTATION:  I was physically present for the key portions of the evaluation and management (E/M) service provided.  I agree with the above history, physical and plan, which I have reviewed and edited where appropriate.    Patient at high risk for neurological deterioration or death due to:  ICU delirium, aspiration PNA, DVT / PE.  Critical care time:  I have personally provided 45 minutes of critical care time, excluding time spent on separate procedures.      Plan discussed with RN, house staff.

## 2024-04-10 NOTE — BRIEF OPERATIVE NOTE - OPERATION/FINDINGS
Transphenoidal resection of pituitary adenoma. Including R middle turbinate resection and septectomy. Low flow CSF leak intraoperatively closed with gel foam, surgicel, middle turbinate mucosal graft, and adheris.

## 2024-04-10 NOTE — CONSULT NOTE ADULT - ATTENDING COMMENTS
Consultation is requested re Acromegaly evaluation. He just had Transphenoidal resection of a 1,5 cm Macroadenoma of the pituitary.He has a history of 12 years of headaches. IGF-1 was 1300 with GH 12.8. Cortisol level was 19.2 Testosterone level was low at 103. ACTH is 69.Glucose elevation is managed with an Insulin drip. He will be started on Lantus 15 units and 5 units pre-meal Lispro Insulin.

## 2024-04-10 NOTE — PROGRESS NOTE ADULT - ASSESSMENT
s/p TSP      NEURO:  - neuro checks q1h  - repeat CTH AM  - CCS per below  - DI watch, endocrine labs, Cortisol  - pain control  - pituitary precautions  - valentin splints on ctx while in place  - nasal sprays, ent following    CVS:  - SBP goal 90 -140  - cardene PRN to meet SBP goal    PULM:  - RA, maintain sats >92%  - no IS, pituitary precautions    RENAL:  - Fluids: NS 75cc/hr  - strict IOs    GI:  - Diet: CLD, ADAT  - GI prophylaxis: N/A  - Bowel regimen: colace, senna    ENDO:   Hyperglycemix  - FS goal 120-180  - AM cortisol  - endo labs am  - DI watch  - Insulin gtt, plan to bridge once tolerting po  - endo following  - hydrocort taper (4/10 - )    HEME/ONC:  - SCDs  - Chemoppx: hold due to fresh post op      ID:  - monitor for fevers

## 2024-04-10 NOTE — PROGRESS NOTE ADULT - SUBJECTIVE AND OBJECTIVE BOX
NEUROSURGERY POST OP NOTE:    POD# 0 S/P TSP for pituitary adenoma, low flow CSF leak repaired with nasoseptal flap.    S: Pt denies HA, nausea, diplopia, chest pain, SOB.       T(C): 36.3 (04-10-24 @ 07:10), Max: 36.3 (04-10-24 @ 06:57)  HR: 99 (04-10-24 @ 12:15) (85 - 99)  BP: 150/68 (04-10-24 @ 12:15) (142/71 - 150/68)  RR: 12 (04-10-24 @ 12:15) (12 - 16)  SpO2: 97% (04-10-24 @ 12:15) (92% - 99%)        acetaminophen     Tablet .. 1000 milliGRAM(s) Oral every 8 hours  atorvastatin 10 milliGRAM(s) Oral at bedtime  cefTRIAXone   IVPB 1000 milliGRAM(s) IV Intermittent every 24 hours  dextrose 50% Injectable 12.5 Gram(s) IV Push once  dextrose 50% Injectable 25 Gram(s) IV Push once  EPINEPHrine   1 mG/mL (1:1,000) Topical Solution 1 Application(s) Topical <User Schedule>  insulin regular Infusion 6 Unit(s)/Hr IV Continuous <Continuous>  ondansetron Injectable 4 milliGRAM(s) IV Push every 6 hours  oxyCODONE    IR 5 milliGRAM(s) Oral every 4 hours PRN  oxyCODONE    IR 10 milliGRAM(s) Oral every 4 hours PRN  pantoprazole    Tablet 40 milliGRAM(s) Oral before breakfast  polyethylene glycol 3350 17 Gram(s) Oral daily  senna 2 Tablet(s) Oral at bedtime  sodium chloride 0.9%. 1000 milliLiter(s) IV Continuous <Continuous>      RADIOLOGY:     Exam:  Gen: Pt found laying in bed in NAD  ENT: EOMi, PERRL  Neuro: A&O x3, FC, OE spontaneously, CN II-XII grossly intact, UE 5/5 b/l, LE 5/5 b/l  Cardio: RRR  Pulm: chest rises symmetrically, no distressed breathing      WOUND/DRAINS:    DEVICES:       Assessment:   32 yo M with PMH DM, HLD, GERD, acromegaly, presenting with headaches. MRI with and without contrast performed on 12/12/23 showing a 1.5 x 1.2 x 1.5cm right sided pituitary macroadenoma. S/p transphenoidal resection of pituitary adenoma with small CSF leak repaired with nasoseptal flap (4/10).     Neuro:  - neuro/vital checks q1h  - Keppra 500mg BID?   - decadron taper?  - pain control: Tylenol prn, oxy 5/10 prn  - skull base precautions  - bed rest 24 hrs    Cardio:  - SBP <140  - HLD: lipitor 10mg     Pulm:  - on face tent    GI:  - bowel regimen   - GERD: omeprazole 40mg   - ADAT    Endo:  - ISS  - endo labs in AM     Renal:   - pryor  - IVF    Heme:  - SCDs     ID:  - CTX (4/10- )    Dispo: NSICU status, full code, pending PT/OT     D/W Dr. Graves and Dr. Ro   NEUROSURGERY POST OP NOTE:    POD# 0 S/P TSP for pituitary adenoma, low flow CSF leak repaired with nasoseptal flap.    S: Pt denies HA, nausea, diplopia, chest pain, SOB.       T(C): 36.3 (04-10-24 @ 07:10), Max: 36.3 (04-10-24 @ 06:57)  HR: 99 (04-10-24 @ 12:15) (85 - 99)  BP: 150/68 (04-10-24 @ 12:15) (142/71 - 150/68)  RR: 12 (04-10-24 @ 12:15) (12 - 16)  SpO2: 97% (04-10-24 @ 12:15) (92% - 99%)        acetaminophen     Tablet .. 1000 milliGRAM(s) Oral every 8 hours  atorvastatin 10 milliGRAM(s) Oral at bedtime  cefTRIAXone   IVPB 1000 milliGRAM(s) IV Intermittent every 24 hours  dextrose 50% Injectable 12.5 Gram(s) IV Push once  dextrose 50% Injectable 25 Gram(s) IV Push once  EPINEPHrine   1 mG/mL (1:1,000) Topical Solution 1 Application(s) Topical <User Schedule>  insulin regular Infusion 6 Unit(s)/Hr IV Continuous <Continuous>  ondansetron Injectable 4 milliGRAM(s) IV Push every 6 hours  oxyCODONE    IR 5 milliGRAM(s) Oral every 4 hours PRN  oxyCODONE    IR 10 milliGRAM(s) Oral every 4 hours PRN  pantoprazole    Tablet 40 milliGRAM(s) Oral before breakfast  polyethylene glycol 3350 17 Gram(s) Oral daily  senna 2 Tablet(s) Oral at bedtime  sodium chloride 0.9%. 1000 milliLiter(s) IV Continuous <Continuous>      RADIOLOGY:     Exam:  Gen: Pt found laying in bed in NAD  ENT: EOMi, PERRL, visual fields intact  Neuro: A&O x3, FC, OE spontaneously, CN II-XII grossly intact, UE 5/5 b/l, LE 5/5 b/l  Cardio: RRR  Pulm: chest rises symmetrically, no distressed breathing  Abd: soft, NTND  Ext: warm, well perfused      WOUND/DRAINS: N/A    DEVICES: N/A      Assessment:   32 yo M with PMH DM, HLD, GERD, acromegaly, presenting with headaches. MRI with and without contrast performed on 12/12/23 showing a 1.5 x 1.2 x 1.5cm right sided pituitary macroadenoma. S/p transphenoidal resection of pituitary adenoma with small CSF leak repaired with nasoseptal flap (4/10).     Neuro:  - neuro/vital checks q1h  - pain control: Tylenol prn, oxy 5/10 prn  - skull base precautions  - bed rest 24 hrs  - no imaging needed    ENT:  - b/l valentin splints in place  - CTX while in house  - nasal saline spray start 4/11     Cardio:  - SBP <140  - HLD: lipitor 10mg     Pulm:  - on face tent    GI:  - bowel regimen   - GERD: protonix   - ADAT    Endo:  - ISS  - endo labs in AM   - endo consulted f/u recs  - hydrocortisone taper     Renal:   - pryor  - IVF    Heme:  - SCDs     ID:  - CTX (4/11- )    Dispo: NSICU status, full code, pending PT/OT     D/W Dr. Graves and Dr. Ro   NEUROSURGERY POST OP NOTE:    POD# 0 S/P TSP for pituitary adenoma, low flow CSF leak repaired with nasoseptal flap.    S: Pt denies HA, nausea, diplopia, chest pain, SOB.       T(C): 36.3 (04-10-24 @ 07:10), Max: 36.3 (04-10-24 @ 06:57)  HR: 99 (04-10-24 @ 12:15) (85 - 99)  BP: 150/68 (04-10-24 @ 12:15) (142/71 - 150/68)  RR: 12 (04-10-24 @ 12:15) (12 - 16)  SpO2: 97% (04-10-24 @ 12:15) (92% - 99%)        acetaminophen     Tablet .. 1000 milliGRAM(s) Oral every 8 hours  atorvastatin 10 milliGRAM(s) Oral at bedtime  cefTRIAXone   IVPB 1000 milliGRAM(s) IV Intermittent every 24 hours  dextrose 50% Injectable 12.5 Gram(s) IV Push once  dextrose 50% Injectable 25 Gram(s) IV Push once  EPINEPHrine   1 mG/mL (1:1,000) Topical Solution 1 Application(s) Topical <User Schedule>  insulin regular Infusion 6 Unit(s)/Hr IV Continuous <Continuous>  ondansetron Injectable 4 milliGRAM(s) IV Push every 6 hours  oxyCODONE    IR 5 milliGRAM(s) Oral every 4 hours PRN  oxyCODONE    IR 10 milliGRAM(s) Oral every 4 hours PRN  pantoprazole    Tablet 40 milliGRAM(s) Oral before breakfast  polyethylene glycol 3350 17 Gram(s) Oral daily  senna 2 Tablet(s) Oral at bedtime  sodium chloride 0.9%. 1000 milliLiter(s) IV Continuous <Continuous>      RADIOLOGY:     Exam:  Gen: Pt found laying in bed in NAD  ENT: EOMi, PERRL, visual fields intact  Neuro: A&O x3, FC, OE spontaneously, CN II-XII grossly intact, UE 5/5 b/l, LE 5/5 b/l  Cardio: RRR  Pulm: chest rises symmetrically, no distressed breathing  Abd: soft, NTND  Ext: warm, well perfused      WOUND/DRAINS: N/A    DEVICES: N/A      Assessment:   34 yo M with PMH DM, HLD, GERD, acromegaly, presenting with headaches. MRI with and without contrast performed on 12/12/23 showing a 1.5 x 1.2 x 1.5cm right sided pituitary macroadenoma. S/p transphenoidal resection of pituitary adenoma with small CSF leak repaired with nasoseptal flap (4/10).     Neuro:  - neuro/vital checks q1h  - pain control: Tylenol prn, oxy 5/10 prn  - skull base precautions  - bed rest 24 hrs  - no imaging needed    ENT:  - b/l valentin splints in place  - CTX while in house  - nasal saline spray start 4/11     Cardio:  - SBP <140  - HLD: lipitor 10mg     Pulm:  - on face tent    GI:  - bowel regimen   - GERD: protonix   - ADAT    Endo:  - ISS  - endo labs in AM   - endo consulted f/u recs  - hydrocortisone taper     Renal:   - pryor  - IVF    Heme:  - SCDs     ID:  - CTX per ENT while in hospital (4/10- )    Dispo: NSICU status, full code, pending PT/OT     D/W Dr. Graves and Dr. Ro   NEUROSURGERY POST OP NOTE:    POD# 0 S/P TSP for pituitary adenoma, low flow CSF leak repaired with nasoseptal flap.    S: Pt denies HA, nausea, diplopia, chest pain, SOB.       T(C): 36.3 (04-10-24 @ 07:10), Max: 36.3 (04-10-24 @ 06:57)  HR: 99 (04-10-24 @ 12:15) (85 - 99)  BP: 150/68 (04-10-24 @ 12:15) (142/71 - 150/68)  RR: 12 (04-10-24 @ 12:15) (12 - 16)  SpO2: 97% (04-10-24 @ 12:15) (92% - 99%)        acetaminophen     Tablet .. 1000 milliGRAM(s) Oral every 8 hours  atorvastatin 10 milliGRAM(s) Oral at bedtime  cefTRIAXone   IVPB 1000 milliGRAM(s) IV Intermittent every 24 hours  dextrose 50% Injectable 12.5 Gram(s) IV Push once  dextrose 50% Injectable 25 Gram(s) IV Push once  EPINEPHrine   1 mG/mL (1:1,000) Topical Solution 1 Application(s) Topical <User Schedule>  insulin regular Infusion 6 Unit(s)/Hr IV Continuous <Continuous>  ondansetron Injectable 4 milliGRAM(s) IV Push every 6 hours  oxyCODONE    IR 5 milliGRAM(s) Oral every 4 hours PRN  oxyCODONE    IR 10 milliGRAM(s) Oral every 4 hours PRN  pantoprazole    Tablet 40 milliGRAM(s) Oral before breakfast  polyethylene glycol 3350 17 Gram(s) Oral daily  senna 2 Tablet(s) Oral at bedtime  sodium chloride 0.9%. 1000 milliLiter(s) IV Continuous <Continuous>      RADIOLOGY:     Exam:  Gen: Pt found laying in bed in NAD  ENT: b/l valentin splints in nares, EOMi, PERRL, visual fields intact  Neuro: A&O x3, FC, OE spontaneously, CN II-XII grossly intact, UE 5/5 b/l, LE 5/5 b/l  Cardio: RRR  Pulm: chest rises symmetrically, no distressed breathing  Abd: soft, NTND  Ext: warm, well perfused      WOUND/DRAINS: N/A    DEVICES: N/A      Assessment:   34 yo M with PMH DM, HLD, GERD, acromegaly, presenting with headaches. MRI with and without contrast performed on 12/12/23 showing a 1.5 x 1.2 x 1.5cm right sided pituitary macroadenoma. S/p transphenoidal resection of pituitary adenoma with small CSF leak repaired with nasoseptal flap (4/10).     Neuro:  - neuro/vital checks q1h  - pain control: Tylenol prn, oxy 5/10 prn  - skull base precautions  - bed rest 24 hrs  - no imaging needed    ENT:  - b/l valentin splints in place  - CTX while in house  - nasal saline spray start 4/11     Cardio:  - SBP <140  - HLD: lipitor 10mg     Pulm:  - on face tent    GI:  - bowel regimen   - GERD: protonix   - ADAT    Endo:  - ISS  - endo labs in AM   - endo consulted f/u recs  - hydrocortisone taper     Renal:   - pryor  - IVF    Heme:  - SCDs     ID:  - CTX per ENT while in hospital (4/10- )    Dispo: NSICU status, full code, pending PT/OT     D/W Dr. Graves and Dr. Ro

## 2024-04-10 NOTE — PROGRESS NOTE ADULT - SUBJECTIVE AND OBJECTIVE BOX
=================================  NEUROCRITICAL CARE ATTENDING NOTE  =================================    ZIA GIBBS   MRN-[id number]  Summary:  33y/M  with history of diabetes, HLD presenting with headaches. MRI with and without contrast performed on 12/12/23 showing a 1.5 x 1.2 x 1.5cm right sided pituitary macroadenoma. Presents today with enlargement of his hands/feet and acromegalic features. Having pain that is waking him up. Denies vision changes. Patient is here now for endoscopic endonasal resection of pituitary mass. (10 Apr 2024 06:45)    COURSE IN THE HOSPITAL:  04/10 POD0    Past Medical History: Diabetes GERD (gastroesophageal reflux disease) H/O headache HLD (hyperlipidemia) Hayfever  Allergies:  No Known Allergies  Home meds:   ·	atorvastatin 10 mg oral tablet: 1 tab(s) orally once a day (at bedtime)  ·	cyclobenzaprine 10 mg oral tablet: 1 tab(s) orally once a day  ·	fluticasone 50 mcg/inh nasal spray: 1 spray(s) in each nostril as needed for  allergy symptoms  ·	metFORMIN 500 mg oral tablet: 1 tab(s) orally 2 times a day  ·	omeprazole 40 mg oral delayed release capsule: 1 cap(s) orally once a day  ·	Ozempic 2 mg/1.5 mL (0.25 mg or 0.5 mg dose) subcutaneous solution: 0.5 milligram(s) subcutaneously every 7 days  ·	Tylenol 500 mg oral tablet: 2 tab(s) orally as needed for  headache    PHYSICAL EXAMINATION  T(C): 36.9 (04-10 @ 18:01), Max: 37.1 (04-10 @ 12:00) HR: 89 (04-10 @ 17:00) (85 - 104) BP: 134/78 (04-10 @ 17:00) (134/78 - 150/68) RR: 16 (04-10 @ 17:00) (12 - 18) SpO2: 96% (04-10 @ 17:00) (92% - 99%)  NEUROLOGIC EXAMINATION:  Patient is awake, alert, fully oriented, pupils 2-3mm equal and briskly reactive to light, EOMs intact, muscle strength 5/5 on all 4s.  GENERAL: not intubated, not in cardiorespiratory distress  EENT:  anicteric  CARDIOVASCULAR: (+) S1 S2, normal rate and regular rhythm  PULMONARY: clear to auscultation bilaterally  ABDOMEN: soft, nontender with normoactive bowel sounds  EXTREMITIES: no edema  SKIN: no rash    LABS:  CAPILLARY BLOOD GLUCOSE 128 159 219 219 244 300 247                        12.3   5.56  )-----------( 258      ( 10 Apr 2024 12:09 )             37.3     04-10    135  |  103  |  12  ----------------------------<  310<H>  4.5   |  25  |  0.76    Ca    9.2      10 Apr 2024 12:09  Phos  3.8     04-10  Mg     1.8     04-10    TPro  6.8  /  Alb  4.2  /  TBili  0.2  /  DBili  x   /  AST  14  /  ALT  17  /  AlkPhos  83  04-10    04-10 @ 07:01  -  04-10 @ 18:23  --------------------------------------------------------  IN: 412 mL / OUT: 950 mL / NET: -538 mL    Bacteriology:  CSF studies:  EEG:  Neuroimaging:  Other imaging:    MEDICATIONS: 04-10    ·	cefTRIAXone   IVPB 1000 IV Intermittent every 24 hours  ·	acetaminophen     Tablet .. 1000 Oral every 8 hours  ·	ondansetron Injectable 4 IV Push every 6 hours  ·	pantoprazole    Tablet 40 Oral before breakfast  ·	polyethylene glycol 3350 17 Oral daily  ·	senna 2 Oral at bedtime  ·	atorvastatin 10 Oral at bedtime  ·	hydrocortisone 50 Oral every 8 hours  ·	insulin glargine Injectable (LANTUS) 15 SubCutaneous at bedtime  ·	insulin lispro (ADMELOG) corrective regimen sliding scale  SubCutaneous Before meals and at bedtime  ·	oxyCODONE    IR 5 Oral every 4 hours PRN  ·	oxyCODONE    IR 10 Oral every 4 hours PRN      IV FLUIDS:  DRIPS:  ·	insulin regular Infusion 6 IV Continuous <Continuous>  DIET:  Lines:  Drains:      Wounds:    CODE STATUS:  Full Code                       GOALS OF CARE:  aggressive                      DISPOSITION:  ICU =================================  NEUROCRITICAL CARE ATTENDING NOTE  =================================    ZIA GIBBS   MRN-[id number]  Summary:  33y/M  with history of diabetes, HLD presenting with headaches. MRI with and without contrast performed on 12/12/23 showing a 1.5 x 1.2 x 1.5cm right sided pituitary macroadenoma. Presents today with enlargement of his hands/feet and acromegalic features. Having pain that is waking him up. Denies vision changes. Patient is here now for endoscopic endonasal resection of pituitary mass. (10 Apr 2024 06:45)    COURSE IN THE HOSPITAL:  04/10 POD0 s/p TSP, complicated with     Past Medical History: Diabetes GERD (gastroesophageal reflux disease) H/O headache HLD (hyperlipidemia) Hayfever  Allergies:  No Known Allergies  Home meds:   ·	atorvastatin 10 mg oral tablet: 1 tab(s) orally once a day (at bedtime)  ·	cyclobenzaprine 10 mg oral tablet: 1 tab(s) orally once a day  ·	fluticasone 50 mcg/inh nasal spray: 1 spray(s) in each nostril as needed for  allergy symptoms  ·	metFORMIN 500 mg oral tablet: 1 tab(s) orally 2 times a day  ·	omeprazole 40 mg oral delayed release capsule: 1 cap(s) orally once a day  ·	Ozempic 2 mg/1.5 mL (0.25 mg or 0.5 mg dose) subcutaneous solution: 0.5 milligram(s) subcutaneously every 7 days  ·	Tylenol 500 mg oral tablet: 2 tab(s) orally as needed for  headache    PHYSICAL EXAMINATION  T(C): 36.9 (04-10 @ 18:01), Max: 37.1 (04-10 @ 12:00) HR: 89 (04-10 @ 17:00) (85 - 104) BP: 134/78 (04-10 @ 17:00) (134/78 - 150/68) RR: 16 (04-10 @ 17:00) (12 - 18) SpO2: 96% (04-10 @ 17:00) (92% - 99%)  NEUROLOGIC EXAMINATION:  Patient is awake, alert, fully oriented, pupils 2-3mm equal and briskly reactive to light, EOMs intact, moving all 4s, VF intact   GENERAL: not intubated, not in cardiorespiratory distress  EENT:  anicteric  CARDIOVASCULAR: (+) S1 S2, normal rate and regular rhythm  PULMONARY: clear to auscultation bilaterally  ABDOMEN: soft, nontender with normoactive bowel sounds  EXTREMITIES: no edema  SKIN: no rash    LABS:  CAPILLARY BLOOD GLUCOSE 128 159 219 219 244 300 247               12.3   5.56  )-----------( 258      ( 10 Apr 2024 12:09 )             37.3     04-10    135  |  103  |  12  ----------------------------<  310<H>  4.5   |  25  |  0.76    Ca    9.2      10 Apr 2024 12:09  Phos  3.8     04-10  Mg     1.8     04-10    TPro  6.8  /  Alb  4.2  /  TBili  0.2  /  DBili  x   /  AST  14  /  ALT  17  /  AlkPhos  83  04-10    04-10 @ 07:01  -  04-10 @ 18:23  --------------------------------------------------------  IN: 412 mL / OUT: 950 mL / NET: -538 mL    HbA1C = 7.9 (04-10)    Bacteriology:  CSF studies:  EEG:  Neuroimaging:  Other imaging:    MEDICATIONS: 04-10    ·	cefTRIAXone   IVPB 1000 IV Intermittent every 24 hours  ·	acetaminophen     Tablet .. 1000 Oral every 8 hours  ·	ondansetron Injectable 4 IV Push every 6 hours  ·	pantoprazole    Tablet 40 Oral before breakfast  ·	polyethylene glycol 3350 17 Oral daily  ·	senna 2 Oral at bedtime  ·	atorvastatin 10 Oral at bedtime  ·	hydrocortisone 50 Oral every 8 hours  ·	insulin glargine Injectable (LANTUS) 15 SubCutaneous at bedtime  ·	insulin lispro (ADMELOG) corrective regimen sliding scale  SubCutaneous Before meals and at bedtime  ·	oxyCODONE    IR 5 Oral every 4 hours PRN  ·	oxyCODONE    IR 10 Oral every 4 hours PRN      IV FLUIDS: D5NS @ 40cc/hr  DRIPS:  ·	insulin regular Infusion 6 IV Continuous <Continuous> - 2.55 units / hr  DIET: CCD   Lines: L radial jennifer   Drains:  Gandhi   Wounds:    CODE STATUS:  Full Code                       GOALS OF CARE:  aggressive                      DISPOSITION:  ICU

## 2024-04-10 NOTE — PRE-ANESTHESIA EVALUATION ADULT - NSANTHPEFT_GEN_ALL_CORE
GEN: A&Ox3, NAD  HEENT: no abnormal facies, neck unremarkable  CV: RRR, no M/R/G  PULM: CTABL, breathing comfortably on RA  NEURO: moves all 4 extremities, no gross deficit GEN: A&Ox3, NAD, acromegalic features  HEENT: no abnormal facies, neck unremarkable  CV: RRR, no M/R/G  PULM: CTABL, breathing comfortably on RA  NEURO: moves all 4 extremities, no gross deficit

## 2024-04-10 NOTE — BRIEF OPERATIVE NOTE - NSICDXBRIEFPROCEDURE_GEN_ALL_CORE_FT
PROCEDURES:  Endoscopic transphenoidal or transnasal resection of pituitary tumor 10-Apr-2024 11:53:00  Renee Marcial  
PROCEDURES:  Endoscopic transphenoidal or transnasal resection of pituitary tumor 10-Apr-2024 11:53:00  Renee Marcial

## 2024-04-10 NOTE — PROGRESS NOTE ADULT - SUBJECTIVE AND OBJECTIVE BOX
NSCU Progress Note    Assessment/Hospital Course:    32 yo M with PMH DM, HLD, GERD, acromegaly, presenting with headaches. MRI with and without contrast performed on 12/12/23 showing a 1.5 x 1.2 x 1.5cm right sided pituitary macroadenoma. S/p transphenoidal resection of pituitary adenoma with small CSF leak repaired with nasoseptal flap (4/10).     24 Hour Events/Subjective:  - POD0 s/p TSP for pituitary tumor resection  c/b csf leak s/p nasoseptal flap      REVIEW OF SYSTEMS:  - negative except as above    VITALS:   - Reviewed      IMAGING/DATA:   - Reviewed          PHYSICAL EXAM:    General: calm  CVS: RRR  Pulm: CTAB  GI: Soft, NTND  Extremities: No LE Edema  Neuro: AOx3, PERRL, EOMI, facial symmetrical, fluent speech, motor 5/5 throughout, no PND, sensation in tact

## 2024-04-10 NOTE — PRE-OP CHECKLIST - 1.
PIV inserted into left AC - saline lock. DZ=[id number]; taken to MRI via wheelchair by GALA Pina PIV inserted into left AC - saline lock. RE=[id number]; taken to MRI via wheelchair by GALA Pina; anesthesia notified of FS result.  Pt was not instructed to use CHG wipes @ home.

## 2024-04-10 NOTE — H&P ADULT - HISTORY OF PRESENT ILLNESS
Information below taken from outpatient records:    Mr. Joseph is a 33 year old male with history of diabetes, HLD presenting with headaches. MRI with and without contrast performed on 12/12/23 showing a 1.5 x 1.2 x 1.5cm right sided pituitary macroadenoma. Presents today with enlargement of his hands/feet and acromegalic features. Having pain that is waking him up. Denies vision changes. Patient is here now for endoscopic endonasal resection of pituitary mass.

## 2024-04-10 NOTE — BRIEF OPERATIVE NOTE - NSICDXBRIEFPREOP_GEN_ALL_CORE_FT
PRE-OP DIAGNOSIS:  Pituitary neoplasm 10-Apr-2024 11:53:11  Renee Marcial  
PRE-OP DIAGNOSIS:  Pituitary neoplasm 10-Apr-2024 11:53:11  Renee Marcial

## 2024-04-10 NOTE — BRIEF OPERATIVE NOTE - OPERATION/FINDINGS
TSP for pituitary adenoma. Low flow CSF leak repaired with nasoseptal flap TSP for pituitary adenoma. Low flow CSF leak repaired with duragen and free mucosal flap

## 2024-04-11 LAB
ANION GAP SERPL CALC-SCNC: 10 MMOL/L — SIGNIFICANT CHANGE UP (ref 5–17)
ANION GAP SERPL CALC-SCNC: 9 MMOL/L — SIGNIFICANT CHANGE UP (ref 5–17)
BUN SERPL-MCNC: 10 MG/DL — SIGNIFICANT CHANGE UP (ref 7–23)
BUN SERPL-MCNC: 9 MG/DL — SIGNIFICANT CHANGE UP (ref 7–23)
CALCIUM SERPL-MCNC: 9.6 MG/DL — SIGNIFICANT CHANGE UP (ref 8.4–10.5)
CALCIUM SERPL-MCNC: 9.7 MG/DL — SIGNIFICANT CHANGE UP (ref 8.4–10.5)
CHLORIDE SERPL-SCNC: 104 MMOL/L — SIGNIFICANT CHANGE UP (ref 96–108)
CHLORIDE SERPL-SCNC: 104 MMOL/L — SIGNIFICANT CHANGE UP (ref 96–108)
CO2 SERPL-SCNC: 26 MMOL/L — SIGNIFICANT CHANGE UP (ref 22–31)
CO2 SERPL-SCNC: 26 MMOL/L — SIGNIFICANT CHANGE UP (ref 22–31)
CORTIS AM PEAK SERPL-MCNC: 38.97 UG/DL — HIGH (ref 6.02–18.4)
CREAT SERPL-MCNC: 0.67 MG/DL — SIGNIFICANT CHANGE UP (ref 0.5–1.3)
CREAT SERPL-MCNC: 0.72 MG/DL — SIGNIFICANT CHANGE UP (ref 0.5–1.3)
EGFR: 122 ML/MIN/1.73M2 — SIGNIFICANT CHANGE UP
EGFR: 125 ML/MIN/1.73M2 — SIGNIFICANT CHANGE UP
GH SERPL-MCNC: 1.86 NG/ML — SIGNIFICANT CHANGE UP (ref 0.03–2.47)
GLUCOSE BLDC GLUCOMTR-MCNC: 143 MG/DL — HIGH (ref 70–99)
GLUCOSE BLDC GLUCOMTR-MCNC: 151 MG/DL — HIGH (ref 70–99)
GLUCOSE BLDC GLUCOMTR-MCNC: 151 MG/DL — HIGH (ref 70–99)
GLUCOSE BLDC GLUCOMTR-MCNC: 204 MG/DL — HIGH (ref 70–99)
GLUCOSE BLDC GLUCOMTR-MCNC: 88 MG/DL — SIGNIFICANT CHANGE UP (ref 70–99)
GLUCOSE BLDC GLUCOMTR-MCNC: 90 MG/DL — SIGNIFICANT CHANGE UP (ref 70–99)
GLUCOSE SERPL-MCNC: 160 MG/DL — HIGH (ref 70–99)
GLUCOSE SERPL-MCNC: 174 MG/DL — HIGH (ref 70–99)
HCT VFR BLD CALC: 37 % — LOW (ref 39–50)
HGB BLD-MCNC: 12.7 G/DL — LOW (ref 13–17)
MAGNESIUM SERPL-MCNC: 2.1 MG/DL — SIGNIFICANT CHANGE UP (ref 1.6–2.6)
MCHC RBC-ENTMCNC: 31 PG — SIGNIFICANT CHANGE UP (ref 27–34)
MCHC RBC-ENTMCNC: 34.3 GM/DL — SIGNIFICANT CHANGE UP (ref 32–36)
MCV RBC AUTO: 90.2 FL — SIGNIFICANT CHANGE UP (ref 80–100)
NRBC # BLD: 0 /100 WBCS — SIGNIFICANT CHANGE UP (ref 0–0)
PHOSPHATE SERPL-MCNC: 6 MG/DL — HIGH (ref 2.5–4.5)
PLATELET # BLD AUTO: 279 K/UL — SIGNIFICANT CHANGE UP (ref 150–400)
POTASSIUM SERPL-MCNC: 3.8 MMOL/L — SIGNIFICANT CHANGE UP (ref 3.5–5.3)
POTASSIUM SERPL-MCNC: 3.9 MMOL/L — SIGNIFICANT CHANGE UP (ref 3.5–5.3)
POTASSIUM SERPL-SCNC: 3.8 MMOL/L — SIGNIFICANT CHANGE UP (ref 3.5–5.3)
POTASSIUM SERPL-SCNC: 3.9 MMOL/L — SIGNIFICANT CHANGE UP (ref 3.5–5.3)
RBC # BLD: 4.1 M/UL — LOW (ref 4.2–5.8)
RBC # FLD: 11.8 % — SIGNIFICANT CHANGE UP (ref 10.3–14.5)
SODIUM SERPL-SCNC: 139 MMOL/L — SIGNIFICANT CHANGE UP (ref 135–145)
SODIUM SERPL-SCNC: 140 MMOL/L — SIGNIFICANT CHANGE UP (ref 135–145)
SP GR SPEC: 1.01 — SIGNIFICANT CHANGE UP (ref 1–1.03)
T4 FREE SERPL-MCNC: 1.57 NG/DL — SIGNIFICANT CHANGE UP (ref 0.93–1.7)
TESTOST FREE+TOTAL PANEL SERPL-MCNC: 126 NG/DL — LOW (ref 300–836)
TSH SERPL-MCNC: 0.25 UIU/ML — LOW (ref 0.27–4.2)
WBC # BLD: 8.55 K/UL — SIGNIFICANT CHANGE UP (ref 3.8–10.5)
WBC # FLD AUTO: 8.55 K/UL — SIGNIFICANT CHANGE UP (ref 3.8–10.5)

## 2024-04-11 PROCEDURE — 99024 POSTOP FOLLOW-UP VISIT: CPT

## 2024-04-11 PROCEDURE — 99233 SBSQ HOSP IP/OBS HIGH 50: CPT

## 2024-04-11 RX ORDER — INSULIN LISPRO 100/ML
3 VIAL (ML) SUBCUTANEOUS
Refills: 0 | Status: DISCONTINUED | OUTPATIENT
Start: 2024-04-11 | End: 2024-04-13

## 2024-04-11 RX ORDER — HYDROCORTISONE 20 MG
50 TABLET ORAL EVERY 12 HOURS
Refills: 0 | Status: DISCONTINUED | OUTPATIENT
Start: 2024-04-11 | End: 2024-04-12

## 2024-04-11 RX ORDER — INSULIN LISPRO 100/ML
VIAL (ML) SUBCUTANEOUS
Refills: 0 | Status: DISCONTINUED | OUTPATIENT
Start: 2024-04-11 | End: 2024-04-13

## 2024-04-11 RX ORDER — CEFTRIAXONE 500 MG/1
2000 INJECTION, POWDER, FOR SOLUTION INTRAMUSCULAR; INTRAVENOUS EVERY 12 HOURS
Refills: 0 | Status: DISCONTINUED | OUTPATIENT
Start: 2024-04-11 | End: 2024-04-13

## 2024-04-11 RX ORDER — SODIUM CHLORIDE 0.65 %
2 AEROSOL, SPRAY (ML) NASAL EVERY 6 HOURS
Refills: 0 | Status: COMPLETED | OUTPATIENT
Start: 2024-04-11 | End: 2024-04-11

## 2024-04-11 RX ORDER — CEFTRIAXONE 500 MG/1
1000 INJECTION, POWDER, FOR SOLUTION INTRAMUSCULAR; INTRAVENOUS ONCE
Refills: 0 | Status: COMPLETED | OUTPATIENT
Start: 2024-04-11 | End: 2024-04-11

## 2024-04-11 RX ADMIN — SENNA PLUS 2 TABLET(S): 8.6 TABLET ORAL at 22:22

## 2024-04-11 RX ADMIN — ONDANSETRON 4 MILLIGRAM(S): 8 TABLET, FILM COATED ORAL at 11:05

## 2024-04-11 RX ADMIN — Medication 4: at 10:42

## 2024-04-11 RX ADMIN — Medication 1000 MILLIGRAM(S): at 07:45

## 2024-04-11 RX ADMIN — Medication 2 SPRAY(S): at 17:21

## 2024-04-11 RX ADMIN — CEFTRIAXONE 100 MILLIGRAM(S): 500 INJECTION, POWDER, FOR SOLUTION INTRAMUSCULAR; INTRAVENOUS at 11:39

## 2024-04-11 RX ADMIN — ONDANSETRON 4 MILLIGRAM(S): 8 TABLET, FILM COATED ORAL at 17:21

## 2024-04-11 RX ADMIN — Medication 5 UNIT(S): at 10:41

## 2024-04-11 RX ADMIN — ONDANSETRON 4 MILLIGRAM(S): 8 TABLET, FILM COATED ORAL at 23:51

## 2024-04-11 RX ADMIN — Medication 5 UNIT(S): at 06:41

## 2024-04-11 RX ADMIN — ONDANSETRON 4 MILLIGRAM(S): 8 TABLET, FILM COATED ORAL at 06:39

## 2024-04-11 RX ADMIN — BENZOCAINE AND MENTHOL 1 LOZENGE: 5; 1 LIQUID ORAL at 06:36

## 2024-04-11 RX ADMIN — POLYETHYLENE GLYCOL 3350 17 GRAM(S): 17 POWDER, FOR SOLUTION ORAL at 11:04

## 2024-04-11 RX ADMIN — Medication 1000 MILLIGRAM(S): at 22:21

## 2024-04-11 RX ADMIN — PANTOPRAZOLE SODIUM 40 MILLIGRAM(S): 20 TABLET, DELAYED RELEASE ORAL at 06:37

## 2024-04-11 RX ADMIN — Medication 1000 MILLIGRAM(S): at 13:01

## 2024-04-11 RX ADMIN — Medication 50 MILLIGRAM(S): at 20:26

## 2024-04-11 RX ADMIN — Medication 1 SPRAY(S): at 11:05

## 2024-04-11 RX ADMIN — Medication 1000 MILLIGRAM(S): at 06:36

## 2024-04-11 RX ADMIN — Medication 1000 MILLIGRAM(S): at 13:41

## 2024-04-11 RX ADMIN — CEFTRIAXONE 100 MILLIGRAM(S): 500 INJECTION, POWDER, FOR SOLUTION INTRAMUSCULAR; INTRAVENOUS at 13:38

## 2024-04-11 RX ADMIN — ATORVASTATIN CALCIUM 10 MILLIGRAM(S): 80 TABLET, FILM COATED ORAL at 22:22

## 2024-04-11 RX ADMIN — Medication 50 MILLIGRAM(S): at 06:37

## 2024-04-11 RX ADMIN — INSULIN GLARGINE 15 UNIT(S): 100 INJECTION, SOLUTION SUBCUTANEOUS at 22:23

## 2024-04-11 RX ADMIN — Medication 1: at 22:22

## 2024-04-11 RX ADMIN — Medication 1000 MILLIGRAM(S): at 23:21

## 2024-04-11 RX ADMIN — ONDANSETRON 4 MILLIGRAM(S): 8 TABLET, FILM COATED ORAL at 00:06

## 2024-04-11 NOTE — PROGRESS NOTE ADULT - ASSESSMENT
s/p TSP      NEURO:  - neuro checks q4h  - CCS per below  - DI watch, endocrine labs, Cortisol  - pain control  - pituitary precautions  - valentin splints on ctx while in place  - nasal sprays, ent following    CVS:  - SBP goal 90 -140  - cardene PRN to meet SBP goal    PULM:  - RA, maintain sats >92%  - no IS, pituitary precautions    RENAL:  - Fluids: IVL  - strict IOs  - TOV    GI:  - Diet: CCD  - GI prophylaxis: N/A  - Bowel regimen: colace, senna    ENDO:   Hyperglycemix  - FS goal 120-180  - DI watch  - insulin glargine 15u qhs, lispro 5u tid ac  - endo following  - hydrocort taper (4/10 - )    HEME/ONC:  - SCDs  - Chemoppx: hold  pending nsg clearance      ID:  - monitor for fevers    ICU stepdown Checklist:    [X] hemodynamically stable – VS WNL and stable x 24hours, UO adequate  [n/a ] if  previously on HDA - off pressors x 24h with stable neuro exam    [X] no new symptoms x 24h (i.e. new fever, new-onset nausea/vomiting)  [X] stable labs: (i.e. WBC not rising, sodium not dropping)  [X] patient not at high risk for aspiration, if high risk then:                  [ ] should have definitive plans for trach/PEG (alternative option is to discharge from ICU to facilty)                  [ ] stepdown to bed close to nurse’s station  [n/a] low suctioning requirements (i.e. q4h or less)  [X] sign-off from primary RN*  [X] drains do not require ICU level of care  [X] if patient previously agitated or with behavioral issues – controlled   [X] pain controlled

## 2024-04-11 NOTE — PROGRESS NOTE ADULT - SUBJECTIVE AND OBJECTIVE BOX
NSCU Progress Note    Assessment/Hospital Course:    34 yo M with PMH DM, HLD, GERD, acromegaly, presenting with headaches. MRI with and without contrast performed on 12/12/23 showing a 1.5 x 1.2 x 1.5cm right sided pituitary macroadenoma. S/p transphenoidal resection of pituitary adenoma with small CSF leak repaired with nasoseptal flap (4/10).     24 Hour Events/Subjective:  - POD1 s/p TSP for pituitary tumor resection  c/b csf leak s/p nasoseptal flap  - UOP~300cc/h, IVL, output decreased  - off insulin gtt    REVIEW OF SYSTEMS:  - negative except as above    VITALS:   - Reviewed      IMAGING/DATA:   - Reviewed          PHYSICAL EXAM:    General: calm  CVS: RRR  Pulm: CTAB  GI: Soft, NTND  Extremities: No LE Edema  Neuro: AOx3, PERRL, EOMI, facial symmetrical (maybe trace Rt facial difficult to assess), fluent speech, motor 5/5 throughout, no PND, sensation in tact

## 2024-04-11 NOTE — PHYSICAL THERAPY INITIAL EVALUATION ADULT - PERTINENT HX OF CURRENT PROBLEM, REHAB EVAL
Mr. Joseph is a 33 year old male with history of diabetes, HLD presenting with headaches. MRI with and without contrast performed on 12/12/23 showing a 1.5 x 1.2 x 1.5cm right sided pituitary macroadenoma. Presents today with enlargement of his hands/feet and acromegalic features. Having pain that is waking him up. Denies vision changes. Patient is here now for endoscopic endonasal resection of pituitary mass.

## 2024-04-11 NOTE — OCCUPATIONAL THERAPY INITIAL EVALUATION ADULT - ADDITIONAL COMMENTS
Pt r handed and does not wear glasses. Pt lives in elevator accessible apartment with 2 FLIP with family. Pt independent at baseline without need for AD or DME.

## 2024-04-11 NOTE — OCCUPATIONAL THERAPY INITIAL EVALUATION ADULT - DIAGNOSIS, OT EVAL
Pt presents to [facility] for transphenoidal resection of pituitary adenoma with small CSF leak repaired with nasoseptal flap performed on 4/10. OT consulted and pt cleared as independent for home.

## 2024-04-11 NOTE — PHYSICAL THERAPY INITIAL EVALUATION ADULT - DIAGNOSIS, PT EVAL
5D: Impaired Motor Function and Sensory Integrity Associated with Nonprogressive Disorders of the Central Nervous System—Acquired in Adolescence or Adulthood 4I: Impaired Joint Mobility, Motor Function, Muscle Performance, and Range of Motion Associated with Bony or Soft Tissue Surgery

## 2024-04-11 NOTE — PROGRESS NOTE ADULT - SUBJECTIVE AND OBJECTIVE BOX
OTOLARYNGOLOGY (ENT) PROGRESS NOTE    PATIENT: ZIA GIBBS  MRN: [id number]  : [birth year]  [id number]  DATE OF SERVICE:  24  			         ID:ZIA GIBBS is a 33M s/p TSPR for growth-hormone secreting pituitary adenoma, low flow CSF leak intraop. Closed with middle turbinate mucosal graft, adheris 4/10.    Subjective/ Interval:   : Patient seen and examined at bedside. AVSS, NAEON. No drainage from nares.     ALLERGIES:  No Known Allergies      MEDICATIONS:  Antiinfectives:   cefTRIAXone   IVPB 1000 milliGRAM(s) IV Intermittent every 24 hours    IV fluids:    Hematologic/Anticoagulation:    Pain medications/Neuro:  acetaminophen     Tablet .. 1000 milliGRAM(s) Oral every 8 hours  ondansetron Injectable 4 milliGRAM(s) IV Push every 6 hours  oxyCODONE    IR 5 milliGRAM(s) Oral every 4 hours PRN  oxyCODONE    IR 10 milliGRAM(s) Oral every 4 hours PRN    Endocrine Medications:   atorvastatin 10 milliGRAM(s) Oral at bedtime  insulin glargine Injectable (LANTUS) 15 Unit(s) SubCutaneous at bedtime  insulin lispro (ADMELOG) corrective regimen sliding scale   SubCutaneous Before meals and at bedtime  insulin lispro Injectable (ADMELOG) 5 Unit(s) SubCutaneous three times a day before meals    All other standing medications:   benzocaine/menthol Lozenge 1 Lozenge Oral every 6 hours  pantoprazole    Tablet 40 milliGRAM(s) Oral before breakfast  polyethylene glycol 3350 17 Gram(s) Oral daily  senna 2 Tablet(s) Oral at bedtime  sodium chloride 0.65% Nasal 1 Spray(s) Both Nostrils daily    All other PRN medications:    Vital Signs Last 24 Hrs  T(C): 37 (2024 04:57), Max: 37.1 (10 Apr 2024 12:00)  T(F): 98.6 (2024 04:57), Max: 98.8 (10 Apr 2024 12:00)  HR: 96 (2024 07:00) (80 - 104)  BP: 137/83 (10 Apr 2024 20:00) (125/80 - 150/68)  BP(mean): 103 (10 Apr 2024 20:00) (95 - 103)  RR: 15 (2024 07:00) (12 - 18)  SpO2: 96% (2024 07:00) (92% - 99%)    Parameters below as of 2024 07:00  Patient On (Oxygen Delivery Method): room air          04-10 @ 07:01  -  - @ 07:00  --------------------------------------------------------  IN:    dextrose 5% + sodium chloride 0.9%: 240 mL    Insulin: 49.6 mL    sodium chloride 0.9%: 375 mL  Total IN: 664.6 mL    OUT:    Indwelling Catheter - Urethral (mL): 1645 mL    Voided (mL): 1300 mL  Total OUT: 2945 mL    Total NET: -2280.4 mL        PHYSICAL EXAM:  GEN: appears stated age, NAD  NEURO: alert & oriented x   HEENT: face symmetric, oropharynx moist without exudates, CN VII/XI/XII intact, valentin splints in place, no drainage from nares  CVS: regular rate and rhythm  Pulm: normal respiratory excursions, not tachypneic, no labored breathing  Abd: non-distended  Ext: moving all four extremities, no peripheral edema noted       LABS                       12.7   8.55  )-----------( 279      ( 2024 05:30 )             37.0    04-11    140  |  104  |  10  ----------------------------<  160<H>  3.9   |  26  |  0.72    Ca    9.7      2024 05:30  Phos  6.0     04-11  Mg     2.1     04-11    TPro  6.8  /  Alb  4.2  /  TBili  0.2  /  DBili  x   /  AST  14  /  ALT  17  /  AlkPhos  83  04-10         Coagulation Studies-   PT/INR - ( 10 Apr 2024 12:11 )   PT: 13.7 sec;   INR: 1.21          PTT - ( 10 Apr 2024 12:11 )  PTT:29.3 sec  Urinalysis Basic - ( 2024 05:30 )    Color: x / Appearance: x / SG: x / pH: x  Gluc: 160 mg/dL / Ketone: x  / Bili: x / Urobili: x   Blood: x / Protein: x / Nitrite: x   Leuk Esterase: x / RBC: x / WBC x   Sq Epi: x / Non Sq Epi: x / Bacteria: x      Endocrine Panel-  Calcium: 9.7 mg/dL ( @ 05:30)  Calcium: 9.6 mg/dL (04-10 @ 23:46)  Calcium: 9.2 mg/dL (04-10 @ 12:09)                MICROBIOLOGY:        RADIOLOGY & ADDITIONAL STUDIES:

## 2024-04-11 NOTE — PROGRESS NOTE ADULT - ASSESSMENT
Assessment and Plan:  ZIA GIBBS is a 33M s/p TSPR for growth-hormone secreting pituitary adenoma, low flow CSF leak intraop. Closed with middle turbinate mucosal graft, adheris 4/10.    PLAN:  - valentin splints in place  - ceftriaxone while admitted  - nasal sprays start today  - 24 hour bed rest  - f/u UOP  - f/u endo    Disposition:   [facility] at [phone] with any questions/concerns.

## 2024-04-11 NOTE — OCCUPATIONAL THERAPY INITIAL EVALUATION ADULT - PERTINENT HX OF CURRENT PROBLEM, REHAB EVAL
32 yo M with PMH DM, HLD, GERD, acromegaly, presenting with headaches. MRI with and without contrast performed on 12/12/23 showing a 1.5 x 1.2 x 1.5cm right sided pituitary macroadenoma. S/p transphenoidal resection of pituitary adenoma with small CSF leak repaired with nasoseptal flap (4/10).

## 2024-04-11 NOTE — OCCUPATIONAL THERAPY INITIAL EVALUATION ADULT - GENERAL OBSERVATIONS, REHAB EVAL
Pt cleared by covering [facility] for OOB with OT; Pt cleared from bedrest by NSGY team. Pt received semisupine +jennifer +tele +IV +NAD +PT Troy present; pt left in chair with all lines intact and needs met, RN aware.

## 2024-04-11 NOTE — PROGRESS NOTE ADULT - SUBJECTIVE AND OBJECTIVE BOX
***INCOMPLETE NOTE***    SUBJECTIVE / INTERVAL HPI: Patient was seen and examined this morning. He remains hemodynamically stable. He has received hydrocortisone 50 mg IV every 8 hours for 3 doses (last dose this morning at 5 AM). Urinary output over the past 24 hours was 2,945 mL (from 4/10/24 at 7 AM to 4/11/24 at 7 AM). Overnight, his output was 1,645 mL (from 4/10/24 at 7 PM to 4/11/24 at 7 AM). is serum sodium remains within normal range at 140 mmol/L (4/11/24 at 5 AM). Repeat labs (4/11/24) showed TSH 0.25. Free T4 of 1.57.    CAPILLARY BLOOD GLUCOSE & INSULIN RECEIVED  244 mg/dL (04-10 @ 13:59) -> NS at 75 mL/hr + Insulin infusion at 7 units/hr.   219 mg/dL (04-10 @ 15:10) -> NS at 75 mL/hr + Insulin infusion at 8 units/hr.   219 mg/dL (04-10 @ 15:58) -> NS at 75 mL/hr + Insulin infusion at 9 units/hr.   159 mg/dL (04-10 @ 17:08) -> NS at 75 mL/hr + Insulin infusion at 4.5 units/hr.   128 mg/dL (04-10 @ 18:02) -> D5-NS at 40 mL/hr + Insulin infusion at 3.4 units/hr.   142 mg/dL (04-10 @ 19:01) -> D5-NS at 40 mL/hr + Insulin infusion at 3.4 units/hr.   110 mg/dL (04-10 @ 19:56) -> D5-NS at 40 mL/hr + Insulin infusion at 2.55 units/hr.   128 mg/dL (04-10 @ 21:09) -> D5-NS at 40 mL/hr + Insulin infusion at 1.91 units/hr.   158 mg/dL (04-10 @ 22:03) -> D5-NS at 40 mL/hr + Insulin infusion at 1.91 units/hr + 15 units of lantus + 2 units of lispro sliding scale.   162 mg/dL (04-10 @ 23:32) -> D5-NS at 40 mL/hr + Insulin infusion at 1.91 units/hr.   ---  mg/dL  (04-11 @ 0:00) -> Insulin and dextrose infusions stopped.   143 mg/dL (04-11 @ 06:33) -> 5 units of lispro.   204 mg/dL (04-11 @ 10:39) -> 5 units of lispro + 4 units of lispro sliding scale.     REVIEW OF SYSTEMS  Constitutional:  Negative fever, chills or loss of appetite.  Eyes:  Negative blurry vision or double vision.  Cardiovascular:  Negative for chest pain or palpitations.  Respiratory:  Negative for cough, wheezing, or shortness of breath.    Gastrointestinal:  Negative for nausea, vomiting, diarrhea, constipation, or abdominal pain.  Genitourinary:  Negative frequency, urgency or dysuria.  Neurologic:  No headache, confusion, dizziness, lightheadedness.    PHYSICAL EXAM  Vital Signs Last 24 Hrs  T(C): 36.8 (11 Apr 2024 09:48), Max: 37 (11 Apr 2024 04:57)  T(F): 98.2 (11 Apr 2024 09:48), Max: 98.6 (11 Apr 2024 04:57)  HR: 83 (11 Apr 2024 12:00) (80 - 104)  BP: 137/83 (10 Apr 2024 20:00) (125/80 - 145/67)  BP(mean): 103 (10 Apr 2024 20:00) (96 - 103)  RR: 16 (11 Apr 2024 12:00) (15 - 18)  SpO2: 95% (11 Apr 2024 12:00) (92% - 99%)    Parameters below as of 11 Apr 2024 12:00  Patient On (Oxygen Delivery Method): room air    Constitutional: Awake, alert, in no acute distress.   HEENT: Normocephalic, atraumatic, AXEL.  Respiratory: Lungs clear to ausculation bilaterally.   Cardiovascular: regular rhythm, normal S1 and S2, no audible murmurs.   GI: soft, non-tender, non-distended, bowel sounds present.  Extremities: No lower extremity edema.  Psychiatric: AAO x 3. Normal affect/mood.     LABS  CBC - WBC/HGB/HTC/PLT: 8.55/12.7/37.0/279 (04-11-24)  BMP - Na/K/Cl/Bicarb/BUN/Cr/Gluc/AG/eGFR: 140/3.9/104/26/10/0.72/160/10/122 (04-11-24)  Ca - 9.7 (04-11-24)  Phos - 6.0 (04-11-24)  Mg - 2.1 (04-11-24)  LFT - Alb/Tprot/Tbili/Dbili/AlkPhos/ALT/AST: 4.2/--/0.2/--/83/17/14 (04-10-24)  PT/aPTT/INR: 13.7/29.3/1.21 (04-10-24)   Thyroid Stimulating Hormone, Serum: 0.250 (04-11-24)  Total T4/Free T4: --/1.570 (04-11-24)    MEDICATIONS  MEDICATIONS  (STANDING):  acetaminophen     Tablet .. 1000 milliGRAM(s) Oral every 8 hours  atorvastatin 10 milliGRAM(s) Oral at bedtime  cefTRIAXone   IVPB 1000 milliGRAM(s) IV Intermittent every 24 hours  insulin glargine Injectable (LANTUS) 15 Unit(s) SubCutaneous at bedtime  insulin lispro (ADMELOG) corrective regimen sliding scale   SubCutaneous Before meals and at bedtime  insulin lispro Injectable (ADMELOG) 5 Unit(s) SubCutaneous three times a day before meals  ondansetron Injectable 4 milliGRAM(s) IV Push every 6 hours  pantoprazole    Tablet 40 milliGRAM(s) Oral before breakfast  polyethylene glycol 3350 17 Gram(s) Oral daily  senna 2 Tablet(s) Oral at bedtime  sodium chloride 0.65% Nasal 1 Spray(s) Both Nostrils daily    MEDICATIONS  (PRN):  benzocaine/menthol Lozenge 1 Lozenge Oral every 6 hours PRN Sore Throat  oxyCODONE    IR 5 milliGRAM(s) Oral every 4 hours PRN Moderate Pain (4 - 6)  oxyCODONE    IR 10 milliGRAM(s) Oral every 4 hours PRN Severe Pain (7 - 10)    ASSESSMENT / RECOMMENDATIONS  Mr. Alban Gómez is a 33-year-old man with acromegaly and type 2 diabetes mellitus, now s/p transphenoidal resection of pituitary tumor (4/10/24). Endocrinology is following for recommendations regarding postoperative management.     A1C: 7.9 %  BUN: 10  Creatinine: 0.72  GFR: 122  Weight: 75.3  BMI: 29.4    # Acromegaly / Pituitary adenoma status post transphenoidal resection  - Most recent labs showed IGFT-1 1314, ACTH 69, GH 12.80, TSH 2.35, FSH 7.5, prolactin 8.1, LH 2.1. Total testosterone 103, cortisol PM 19.2.   - Urinary output over the past 24 hours was 2,945 mL (from 4/10/24 at 7 AM to 4/11/24 at 7 AM). Overnight, his output was 1,645 mL (from 4/10/24 at 7 PM to 4/11/24 at 7 AM). is serum sodium remains within normal range at 140 mmol/L (4/11/24 at 5 AM). Continue monitoring intake and output closely. If the urine output is increasing >150-200ml/hr for consecutive 2-3 hours and Na level >145, consider 1x DDAVP 0.25 mcg.  - Repeat labs (4/11/24) showed TSH 0.25. Free T4 of 1.57.  - Follow GH, IGF-1 and total testosterone in process.     # Type 2 diabetes mellitus with hyperglycemia  - Please continue lantus 15 units at bedtime.   - Continue lispro 5 units before each meal.  - Continue lispro moderate dose sliding scale before meals and at bedtime.  - Patient's fingerstick glucose goal is 100-180 mg/dL.    - Discharge recommendations to be discussed.   - Patient can follow up at discharge with [facility] by calling [phone] to make an appointment.      Case discussed with Dr. Maldonado. Primary team updated.       Royce Walker    Endocrinology Fellow    Service Pager: [phone]    SUBJECTIVE / INTERVAL HPI: Patient was seen and examined this morning. He remains hemodynamically stable. He has received hydrocortisone 50 mg IV every 8 hours for 3 doses (last dose this morning at 5 AM). Urinary output over the past 24 hours was 2,945 mL (from 4/10/24 at 7 AM to 4/11/24 at 7 AM). Overnight, his output was 1,645 mL (from 4/10/24 at 7 PM to 4/11/24 at 7 AM). is serum sodium remains within normal range at 140 mmol/L (4/11/24 at 5 AM). Repeat labs (4/11/24) showed TSH 0.25. Free T4 of 1.57.    CAPILLARY BLOOD GLUCOSE & INSULIN RECEIVED  244 mg/dL (04-10 @ 13:59) -> NS at 75 mL/hr + Insulin infusion at 7 units/hr.   219 mg/dL (04-10 @ 15:10) -> NS at 75 mL/hr + Insulin infusion at 8 units/hr.   219 mg/dL (04-10 @ 15:58) -> NS at 75 mL/hr + Insulin infusion at 9 units/hr.   159 mg/dL (04-10 @ 17:08) -> NS at 75 mL/hr + Insulin infusion at 4.5 units/hr.   128 mg/dL (04-10 @ 18:02) -> D5-NS at 40 mL/hr + Insulin infusion at 3.4 units/hr.   142 mg/dL (04-10 @ 19:01) -> D5-NS at 40 mL/hr + Insulin infusion at 3.4 units/hr.   110 mg/dL (04-10 @ 19:56) -> D5-NS at 40 mL/hr + Insulin infusion at 2.55 units/hr.   128 mg/dL (04-10 @ 21:09) -> D5-NS at 40 mL/hr + Insulin infusion at 1.91 units/hr.   158 mg/dL (04-10 @ 22:03) -> D5-NS at 40 mL/hr + Insulin infusion at 1.91 units/hr + 15 units of lantus + 2 units of lispro sliding scale.   162 mg/dL (04-10 @ 23:32) -> D5-NS at 40 mL/hr + Insulin infusion at 1.91 units/hr.   ---  mg/dL  (04-11 @ 0:00) -> Insulin and dextrose infusions stopped.   143 mg/dL (04-11 @ 06:33) -> 5 units of lispro.   204 mg/dL (04-11 @ 10:39) -> 5 units of lispro + 4 units of lispro sliding scale.   88 mg/dL (04-11 @ 15:28)  90 mg/dL (04-11 @ 16:50)    REVIEW OF SYSTEMS  Constitutional:  Negative fever, chills or loss of appetite.  Cardiovascular:  Negative for chest pain or palpitations.  Respiratory:  Negative for cough, wheezing, or shortness of breath.    Gastrointestinal:  Negative for nausea, vomiting, diarrhea, constipation, or abdominal pain.  Neurologic:  No headache, confusion, dizziness, lightheadedness.    PHYSICAL EXAM  Vital Signs Last 24 Hrs  T(C): 36.8 (11 Apr 2024 09:48), Max: 37 (11 Apr 2024 04:57)  T(F): 98.2 (11 Apr 2024 09:48), Max: 98.6 (11 Apr 2024 04:57)  HR: 83 (11 Apr 2024 12:00) (80 - 104)  BP: 137/83 (10 Apr 2024 20:00) (125/80 - 145/67)  BP(mean): 103 (10 Apr 2024 20:00) (96 - 103)  RR: 16 (11 Apr 2024 12:00) (15 - 18)  SpO2: 95% (11 Apr 2024 12:00) (92% - 99%)    Parameters below as of 11 Apr 2024 12:00  Patient On (Oxygen Delivery Method): room air    Constitutional: Awake, alert, male, in no acute distress.   HEENT: Normocephalic, atraumatic, AXEL.  Respiratory: Lungs clear to ausculation bilaterally.   Cardiovascular: regular rhythm, normal S1 and S2, no audible murmurs.   GI: soft, non-tender, non-distended, bowel sounds present.  Extremities: No lower extremity edema.  Psychiatric: AAO x 3.     LABS  CBC - WBC/HGB/HTC/PLT: 8.55/12.7/37.0/279 (04-11-24)  BMP - Na/K/Cl/Bicarb/BUN/Cr/Gluc/AG/eGFR: 140/3.9/104/26/10/0.72/160/10/122 (04-11-24)  Ca - 9.7 (04-11-24)  Phos - 6.0 (04-11-24)  Mg - 2.1 (04-11-24)  LFT - Alb/Tprot/Tbili/Dbili/AlkPhos/ALT/AST: 4.2/--/0.2/--/83/17/14 (04-10-24)  PT/aPTT/INR: 13.7/29.3/1.21 (04-10-24)   Thyroid Stimulating Hormone, Serum: 0.250 (04-11-24)  Total T4/Free T4: --/1.570 (04-11-24)    MEDICATIONS  MEDICATIONS  (STANDING):  acetaminophen     Tablet .. 1000 milliGRAM(s) Oral every 8 hours  atorvastatin 10 milliGRAM(s) Oral at bedtime  cefTRIAXone   IVPB 1000 milliGRAM(s) IV Intermittent every 24 hours  insulin glargine Injectable (LANTUS) 15 Unit(s) SubCutaneous at bedtime  insulin lispro (ADMELOG) corrective regimen sliding scale   SubCutaneous Before meals and at bedtime  insulin lispro Injectable (ADMELOG) 5 Unit(s) SubCutaneous three times a day before meals  ondansetron Injectable 4 milliGRAM(s) IV Push every 6 hours  pantoprazole    Tablet 40 milliGRAM(s) Oral before breakfast  polyethylene glycol 3350 17 Gram(s) Oral daily  senna 2 Tablet(s) Oral at bedtime  sodium chloride 0.65% Nasal 1 Spray(s) Both Nostrils daily    MEDICATIONS  (PRN):  benzocaine/menthol Lozenge 1 Lozenge Oral every 6 hours PRN Sore Throat  oxyCODONE    IR 5 milliGRAM(s) Oral every 4 hours PRN Moderate Pain (4 - 6)  oxyCODONE    IR 10 milliGRAM(s) Oral every 4 hours PRN Severe Pain (7 - 10)    ASSESSMENT / RECOMMENDATIONS  Mr. Alban Gómez is a 33-year-old man with acromegaly and type 2 diabetes mellitus, now s/p transphenoidal resection of pituitary tumor (4/10/24). Endocrinology is following for recommendations regarding postoperative management.     A1C: 7.9 %  BUN: 10  Creatinine: 0.72  GFR: 122  Weight: 75.3  BMI: 29.4    # Acromegaly / Pituitary adenoma status post transphenoidal resection  - Most recent labs showed IGFT-1 1314, ACTH 69, GH 12.80, TSH 2.35, FSH 7.5, prolactin 8.1, LH 2.1. Total testosterone 103, cortisol PM 19.2.   - Urinary output over the past 24 hours was 2,945 mL (from 4/10/24 at 7 AM to 4/11/24 at 7 AM). Overnight, his output was 1,645 mL (from 4/10/24 at 7 PM to 4/11/24 at 7 AM). is serum sodium remains within normal range at 140 mmol/L (4/11/24 at 5 AM). Continue monitoring intake and output closely. If the urine output is increasing >150-200ml/hr for consecutive 2-3 hours and Na level >145, consider 1x DDAVP 0.25 mcg.  - Repeat labs (4/11/24) showed TSH 0.25. Free T4 of 1.57. Follow GH, IGF-1 and total testosterone in process.   - He remains hemodynamically stable. DECREASE hydrocortisone to 50 mg every 12 hours for now.     # Type 2 diabetes mellitus with hyperglycemia  - His glucose dropped significantly after having lunch today. He reports eating most of his meal which contained mashed potatoes. Therefore, will recommend to decrease premeal insulin.   - Please continue lantus 15 units at bedtime.   - DECREASE lispro to 3 units before each meal.  - CHANGE lispro sliding scale to a low dose before meals and at bedtime.  - Patient's fingerstick glucose goal is 100-180 mg/dL.    - Discharge recommendations to be discussed.   - Patient can follow up at discharge with [facility] by calling [phone] to make an appointment.      Case discussed with Dr. Maldonado. Primary team updated.       Royce Walker    Endocrinology Fellow    Service Pager: [phone]

## 2024-04-11 NOTE — PROGRESS NOTE ADULT - SUBJECTIVE AND OBJECTIVE BOX
HPI: 34 yo M with PMH DM, HLD, GERD, acromegaly, presenting with headaches. MRI with and without contrast performed on 12/12/23 showing a 1.5 x 1.2 x 1.5cm right sided pituitary macroadenoma. S/p transphenoidal resection of pituitary adenoma with small CSF leak repaired with nasoseptal flap (4/10).     S/Overnight events: No acute events overnight. Patient not complaining of pain, resting in bed.     Hospital Course:   4/10: POD 0 TSP with small CSF leak repaired with nasoseptal flap frozen: pituitary adenoma. Endo consulted. Started on insulin gtt in OR, continued post-op. Started D5/NS.  and 330 for 21:00 and 22:00, ordered BMP and specific gravity. Dc'd D5/NS.     Vital Signs Last 24 Hrs  T(C): 36.3 (10 Apr 2024 22:01), Max: 37.1 (10 Apr 2024 12:00)  T(F): 97.4 (10 Apr 2024 22:01), Max: 98.8 (10 Apr 2024 12:00)  HR: 100 (10 Apr 2024 22:00) (85 - 104)  BP: 137/83 (10 Apr 2024 20:00) (125/80 - 150/68)  BP(mean): 103 (10 Apr 2024 20:00) (95 - 103)  RR: 18 (10 Apr 2024 22:00) (12 - 18)  SpO2: 96% (10 Apr 2024 22:00) (92% - 99%)    Parameters below as of 10 Apr 2024 22:00  Patient On (Oxygen Delivery Method): room air        I&O's Detail    10 Apr 2024 07:01  -  11 Apr 2024 00:10  --------------------------------------------------------  IN:    dextrose 5% + sodium chloride 0.9%: 240 mL    Insulin: 47.7 mL    sodium chloride 0.9%: 375 mL  Total IN: 662.7 mL    OUT:    Voided (mL): 2055 mL  Total OUT: 2055 mL    Total NET: -1392.3 mL        I&O's Summary    10 Apr 2024 07:01  -  11 Apr 2024 00:10  --------------------------------------------------------  IN: 662.7 mL / OUT: 2055 mL / NET: -1392.3 mL        PHYSICAL EXAM:  Constitutional: NAD, resting comfortably in bed.   HEENT: Pupils equal, round, reactive to light. EOMI. Face symmetric, tongue midline. Visual fields intact.   Respiratory: No respiratory distress, lungs clear to auscultation bilaterally.   Cardiovascular: RRR, S1, S2.   Gastrointestinal: Abdomen soft, non tender, nondistended.  Neurological:  AAOX3. Opens eyes. Follows commands. Speech clear.  Cranial Nerves: II-XII intact  Motor: 5/5 power in b/l UE and LE  Sensation: intact to light touch in all extremities  Pronator Drift: none  Dysmetria: not tested   Extremities: Warm, well perfused.  Wounds:     TUBES/LINES:  [] CVC  [x] A-line  [] Lumbar Drain  [] Ventriculostomy  [x] Gandhi  [] Other    DIET:  [] NPO  [x] Mechanical  [] Tube feeds    LABS:                        12.3   5.56  )-----------( 258      ( 10 Apr 2024 12:09 )             37.3     04-10    135  |  103  |  12  ----------------------------<  310<H>  4.5   |  25  |  0.76    Ca    9.2      10 Apr 2024 12:09  Phos  3.8     04-10  Mg     1.8     04-10    TPro  6.8  /  Alb  4.2  /  TBili  0.2  /  DBili  x   /  AST  14  /  ALT  17  /  AlkPhos  83  04-10    PT/INR - ( 10 Apr 2024 12:11 )   PT: 13.7 sec;   INR: 1.21          PTT - ( 10 Apr 2024 12:11 )  PTT:29.3 sec  Urinalysis Basic - ( 10 Apr 2024 12:09 )    Color: x / Appearance: x / SG: x / pH: x  Gluc: 310 mg/dL / Ketone: x  / Bili: x / Urobili: x   Blood: x / Protein: x / Nitrite: x   Leuk Esterase: x / RBC: x / WBC x   Sq Epi: x / Non Sq Epi: x / Bacteria: x          CAPILLARY BLOOD GLUCOSE      POCT Blood Glucose.: 151 mg/dL (11 Apr 2024 00:07)  POCT Blood Glucose.: 162 mg/dL (10 Apr 2024 23:32)  POCT Blood Glucose.: 158 mg/dL (10 Apr 2024 22:03)  POCT Blood Glucose.: 128 mg/dL (10 Apr 2024 21:09)  POCT Blood Glucose.: 110 mg/dL (10 Apr 2024 19:56)  POCT Blood Glucose.: 142 mg/dL (10 Apr 2024 19:01)  POCT Blood Glucose.: 128 mg/dL (10 Apr 2024 18:02)  POCT Blood Glucose.: 159 mg/dL (10 Apr 2024 17:08)  POCT Blood Glucose.: 219 mg/dL (10 Apr 2024 15:58)  POCT Blood Glucose.: 219 mg/dL (10 Apr 2024 15:10)  POCT Blood Glucose.: 244 mg/dL (10 Apr 2024 13:59)  POCT Blood Glucose.: 300 mg/dL (10 Apr 2024 11:49)  POCT Blood Glucose.: 247 mg/dL (10 Apr 2024 06:51)      Drug Levels: [] N/A    CSF Analysis: [] N/A      Allergies    No Known Allergies    Intolerances      MEDICATIONS:  Antibiotics:  cefTRIAXone   IVPB 1000 milliGRAM(s) IV Intermittent every 24 hours    Neuro:  acetaminophen     Tablet .. 1000 milliGRAM(s) Oral every 8 hours  ondansetron Injectable 4 milliGRAM(s) IV Push every 6 hours  oxyCODONE    IR 5 milliGRAM(s) Oral every 4 hours PRN  oxyCODONE    IR 10 milliGRAM(s) Oral every 4 hours PRN    Anticoagulation:    OTHER:  atorvastatin 10 milliGRAM(s) Oral at bedtime  benzocaine/menthol Lozenge 1 Lozenge Oral every 6 hours  dextrose 50% Injectable 25 Gram(s) IV Push once  dextrose 50% Injectable 12.5 Gram(s) IV Push once  hydrocortisone 50 milliGRAM(s) Oral every 8 hours  insulin glargine Injectable (LANTUS) 15 Unit(s) SubCutaneous at bedtime  insulin lispro (ADMELOG) corrective regimen sliding scale   SubCutaneous Before meals and at bedtime  insulin lispro Injectable (ADMELOG) 5 Unit(s) SubCutaneous three times a day before meals  insulin regular Infusion 6 Unit(s)/Hr IV Continuous <Continuous>  pantoprazole    Tablet 40 milliGRAM(s) Oral before breakfast  polyethylene glycol 3350 17 Gram(s) Oral daily  senna 2 Tablet(s) Oral at bedtime  sodium chloride 0.65% Nasal 1 Spray(s) Both Nostrils daily    IVF:    CULTURES:    RADIOLOGY & ADDITIONAL TESTS:      ASSESSMENT:  34 yo M with PMH DM, HLD, GERD, acromegaly, presenting with headaches. MRI with and without contrast performed on 12/12/23 showing a 1.5 x 1.2 x 1.5cm right sided pituitary macroadenoma. S/p transphenoidal resection of pituitary adenoma with small CSF leak repaired with nasoseptal flap (4/10).       D35.2    Handoff    Diabetes    GERD (gastroesophageal reflux disease)    H/O headache    HLD (hyperlipidemia)    Hayfever    Pituitary neoplasm    Pituitary neoplasm    Endoscopic transphenoidal or transnasal resection of pituitary tumor    No significant past surgical history    SysAdmin_VstLnk        PLAN:  Neuro:  - neuro/vital checks q1h  - pain control: Tylenol prn, oxy 5/10 prn  - skull base precautions  - bed rest 24 hrs  - No imaging needed     ENT:  - BL valentin splints in place, will be removed outpatient   - CTX while in hospital   - Saline nasal spray on 4/11    Cardio:  - SBP <140  - HLD: lipitor 10mg     Pulm:  - RA    GI:  - ADAT  - bowel regimen   - GERD: protonix (takes omeprazole at home)    Endo:  - A1c: 7.9  - endo labs in AM   - DM: Insulin gtt, given lantus 15 to bridge  - once eating: lantus 5 qhs, lispro 5u, ISS  - Hydrocortisone taper 50q8, f/u endo recs    Renal:   - +Gandhi    Heme:  - SCDs for DVT prophylaxis     ID:  - CTX per ENT while in hospital  - Afebrile     Dispo: NSICU status, full code, pending PT/OT     D/W Dr. Graves and Dr. Ro    Assessment:  Present when checked    []  GCS  E   V  M     Heart Failure: []Acute, [] acute on chronic , []chronic  Heart Failure:  [] Diastolic (HFpEF), [] Systolic (HFrEF), []Combined (HFpEF and HFrEF), [] RHF, [] Pulm HTN, [] Other    [] MARY, [] ATN, [] AIN, [] other  [] CKD1, [] CKD2, [] CKD 3, [] CKD 4, [] CKD 5, []ESRD    Encephalopathy: [] Metabolic, [] Hepatic, [] toxic, [] Neurological, [] Other    Abnormal Nurtitional Status: [] malnurtition (see nutrition note), [ ]underweight: BMI < 19, [] morbid obesity: BMI >40, [] Cachexia    [] Sepsis  [] hypovolemic shock,[] cardiogenic shock, [] hemorrhagic shock, [] neuogenic shock  [] Acute Respiratory Failure  []Cerebral edema, [] Brain compression/ herniation,   [] Functional quadriplegia  [] Acute blood loss anemia

## 2024-04-12 ENCOUNTER — TRANSCRIPTION ENCOUNTER (OUTPATIENT)
Age: 34
End: 2024-04-12

## 2024-04-12 PROBLEM — E11.9 TYPE 2 DIABETES MELLITUS WITHOUT COMPLICATIONS: Chronic | Status: ACTIVE | Noted: 2024-04-09

## 2024-04-12 PROBLEM — E78.5 HYPERLIPIDEMIA, UNSPECIFIED: Chronic | Status: ACTIVE | Noted: 2024-04-09

## 2024-04-12 PROBLEM — J30.1 ALLERGIC RHINITIS DUE TO POLLEN: Chronic | Status: ACTIVE | Noted: 2024-04-09

## 2024-04-12 PROBLEM — Z87.898 PERSONAL HISTORY OF OTHER SPECIFIED CONDITIONS: Chronic | Status: ACTIVE | Noted: 2024-04-09

## 2024-04-12 PROBLEM — K21.9 GASTRO-ESOPHAGEAL REFLUX DISEASE WITHOUT ESOPHAGITIS: Chronic | Status: ACTIVE | Noted: 2024-04-09

## 2024-04-12 LAB
ANION GAP SERPL CALC-SCNC: 10 MMOL/L — SIGNIFICANT CHANGE UP (ref 5–17)
ANION GAP SERPL CALC-SCNC: 12 MMOL/L — SIGNIFICANT CHANGE UP (ref 5–17)
BUN SERPL-MCNC: 19 MG/DL — SIGNIFICANT CHANGE UP (ref 7–23)
BUN SERPL-MCNC: 23 MG/DL — SIGNIFICANT CHANGE UP (ref 7–23)
CALCIUM SERPL-MCNC: 9.2 MG/DL — SIGNIFICANT CHANGE UP (ref 8.4–10.5)
CALCIUM SERPL-MCNC: 9.9 MG/DL — SIGNIFICANT CHANGE UP (ref 8.4–10.5)
CHLORIDE SERPL-SCNC: 100 MMOL/L — SIGNIFICANT CHANGE UP (ref 96–108)
CHLORIDE SERPL-SCNC: 103 MMOL/L — SIGNIFICANT CHANGE UP (ref 96–108)
CO2 SERPL-SCNC: 26 MMOL/L — SIGNIFICANT CHANGE UP (ref 22–31)
CO2 SERPL-SCNC: 26 MMOL/L — SIGNIFICANT CHANGE UP (ref 22–31)
CREAT SERPL-MCNC: 0.74 MG/DL — SIGNIFICANT CHANGE UP (ref 0.5–1.3)
CREAT SERPL-MCNC: 1.03 MG/DL — SIGNIFICANT CHANGE UP (ref 0.5–1.3)
EGFR: 121 ML/MIN/1.73M2 — SIGNIFICANT CHANGE UP
EGFR: 97 ML/MIN/1.73M2 — SIGNIFICANT CHANGE UP
GLUCOSE BLDC GLUCOMTR-MCNC: 139 MG/DL — HIGH (ref 70–99)
GLUCOSE BLDC GLUCOMTR-MCNC: 162 MG/DL — HIGH (ref 70–99)
GLUCOSE BLDC GLUCOMTR-MCNC: 245 MG/DL — HIGH (ref 70–99)
GLUCOSE BLDC GLUCOMTR-MCNC: 85 MG/DL — SIGNIFICANT CHANGE UP (ref 70–99)
GLUCOSE SERPL-MCNC: 162 MG/DL — HIGH (ref 70–99)
GLUCOSE SERPL-MCNC: 181 MG/DL — HIGH (ref 70–99)
HCT VFR BLD CALC: 38.7 % — LOW (ref 39–50)
HGB BLD-MCNC: 12.9 G/DL — LOW (ref 13–17)
MAGNESIUM SERPL-MCNC: 2.3 MG/DL — SIGNIFICANT CHANGE UP (ref 1.6–2.6)
MCHC RBC-ENTMCNC: 30.9 PG — SIGNIFICANT CHANGE UP (ref 27–34)
MCHC RBC-ENTMCNC: 33.3 GM/DL — SIGNIFICANT CHANGE UP (ref 32–36)
MCV RBC AUTO: 92.8 FL — SIGNIFICANT CHANGE UP (ref 80–100)
NRBC # BLD: 0 /100 WBCS — SIGNIFICANT CHANGE UP (ref 0–0)
PHOSPHATE SERPL-MCNC: 4.7 MG/DL — HIGH (ref 2.5–4.5)
PLATELET # BLD AUTO: 270 K/UL — SIGNIFICANT CHANGE UP (ref 150–400)
POTASSIUM SERPL-MCNC: 3.9 MMOL/L — SIGNIFICANT CHANGE UP (ref 3.5–5.3)
POTASSIUM SERPL-MCNC: 4.4 MMOL/L — SIGNIFICANT CHANGE UP (ref 3.5–5.3)
POTASSIUM SERPL-SCNC: 3.9 MMOL/L — SIGNIFICANT CHANGE UP (ref 3.5–5.3)
POTASSIUM SERPL-SCNC: 4.4 MMOL/L — SIGNIFICANT CHANGE UP (ref 3.5–5.3)
RBC # BLD: 4.17 M/UL — LOW (ref 4.2–5.8)
RBC # FLD: 12.1 % — SIGNIFICANT CHANGE UP (ref 10.3–14.5)
SODIUM SERPL-SCNC: 138 MMOL/L — SIGNIFICANT CHANGE UP (ref 135–145)
SODIUM SERPL-SCNC: 139 MMOL/L — SIGNIFICANT CHANGE UP (ref 135–145)
WBC # BLD: 6.65 K/UL — SIGNIFICANT CHANGE UP (ref 3.8–10.5)
WBC # FLD AUTO: 6.65 K/UL — SIGNIFICANT CHANGE UP (ref 3.8–10.5)

## 2024-04-12 PROCEDURE — 99223 1ST HOSP IP/OBS HIGH 75: CPT

## 2024-04-12 PROCEDURE — 99233 SBSQ HOSP IP/OBS HIGH 50: CPT

## 2024-04-12 PROCEDURE — 99024 POSTOP FOLLOW-UP VISIT: CPT

## 2024-04-12 RX ORDER — SODIUM CHLORIDE 0.65 %
1 AEROSOL, SPRAY (ML) NASAL EVERY 6 HOURS
Refills: 0 | Status: DISCONTINUED | OUTPATIENT
Start: 2024-04-12 | End: 2024-04-13

## 2024-04-12 RX ORDER — ACETAMINOPHEN 500 MG
1000 TABLET ORAL EVERY 8 HOURS
Refills: 0 | Status: DISCONTINUED | OUTPATIENT
Start: 2024-04-12 | End: 2024-04-13

## 2024-04-12 RX ORDER — HYDROCORTISONE 20 MG
25 TABLET ORAL EVERY 12 HOURS
Refills: 0 | Status: DISCONTINUED | OUTPATIENT
Start: 2024-04-12 | End: 2024-04-13

## 2024-04-12 RX ADMIN — POLYETHYLENE GLYCOL 3350 17 GRAM(S): 17 POWDER, FOR SOLUTION ORAL at 12:22

## 2024-04-12 RX ADMIN — Medication 50 MILLIGRAM(S): at 06:12

## 2024-04-12 RX ADMIN — Medication 1 SPRAY(S): at 18:01

## 2024-04-12 RX ADMIN — Medication 25 MILLIGRAM(S): at 18:01

## 2024-04-12 RX ADMIN — Medication 3 UNIT(S): at 07:45

## 2024-04-12 RX ADMIN — Medication 2: at 22:08

## 2024-04-12 RX ADMIN — CEFTRIAXONE 100 MILLIGRAM(S): 500 INJECTION, POWDER, FOR SOLUTION INTRAMUSCULAR; INTRAVENOUS at 00:00

## 2024-04-12 RX ADMIN — ATORVASTATIN CALCIUM 10 MILLIGRAM(S): 80 TABLET, FILM COATED ORAL at 22:00

## 2024-04-12 RX ADMIN — Medication 1 SPRAY(S): at 23:32

## 2024-04-12 RX ADMIN — Medication 1: at 12:58

## 2024-04-12 RX ADMIN — Medication 3 UNIT(S): at 12:58

## 2024-04-12 RX ADMIN — Medication 3 UNIT(S): at 18:01

## 2024-04-12 RX ADMIN — Medication 1000 MILLIGRAM(S): at 06:11

## 2024-04-12 RX ADMIN — Medication 1000 MILLIGRAM(S): at 22:00

## 2024-04-12 RX ADMIN — Medication 1000 MILLIGRAM(S): at 07:11

## 2024-04-12 RX ADMIN — Medication 1000 MILLIGRAM(S): at 13:00

## 2024-04-12 RX ADMIN — CEFTRIAXONE 100 MILLIGRAM(S): 500 INJECTION, POWDER, FOR SOLUTION INTRAMUSCULAR; INTRAVENOUS at 12:22

## 2024-04-12 RX ADMIN — ONDANSETRON 4 MILLIGRAM(S): 8 TABLET, FILM COATED ORAL at 06:12

## 2024-04-12 RX ADMIN — INSULIN GLARGINE 15 UNIT(S): 100 INJECTION, SOLUTION SUBCUTANEOUS at 22:08

## 2024-04-12 RX ADMIN — ONDANSETRON 4 MILLIGRAM(S): 8 TABLET, FILM COATED ORAL at 12:22

## 2024-04-12 RX ADMIN — SENNA PLUS 2 TABLET(S): 8.6 TABLET ORAL at 22:00

## 2024-04-12 RX ADMIN — PANTOPRAZOLE SODIUM 40 MILLIGRAM(S): 20 TABLET, DELAYED RELEASE ORAL at 06:11

## 2024-04-12 RX ADMIN — Medication 1000 MILLIGRAM(S): at 23:00

## 2024-04-12 NOTE — PROGRESS NOTE ADULT - ASSESSMENT
ZIA GIBBS is a 33M s/p TSPR for growth-hormone secreting pituitary adenoma, low flow CSF leak intraop. Closed with middle turbinate mucosal graft, adheris 4/10.    PLAN:  - valentin splints in place  - ceftriaxone while admitted  - nasal sprays start today  - 24 hour bed rest  - f/u UOP  - f/u endo    Disposition:   [facility] at [phone] with any questions/concerns. ZIA GIBBS is a 33M s/p TSPR for growth-hormone secreting pituitary adenoma, low flow CSF leak intraop. Closed with middle turbinate mucosal graft, adheris 4/10.    PLAN:  - valentin splints in place  - ceftriaxone while admitted  - nasal sprays   - keep overnight 4/12, discharge 4/13  - Follow up with Dr. Vega in clinic next Thurs for removal of doyles     Disposition:   [facility] at [phone] with any questions/concerns.

## 2024-04-12 NOTE — DISCHARGE NOTE PROVIDER - NSDCFUADDAPPT_GEN_ALL_CORE_FT
Please schedule a follow-up appointment with otolaryngology  Please schedule a follow-up appointment with endocrinology  Please call Dr. Graves's office to confirm your follow-up appointment  Please call Dr. Graves's office to confirm your follow-up appointment, which will be in 2 weeks.   Please call Dr. Vega's office to confirm your appointment on Thursday (4/18).   Please schedule a follow-up appointment with endocrinology, Dr. Maldonado.

## 2024-04-12 NOTE — DISCHARGE NOTE PROVIDER - PROVIDER TOKENS
PROVIDER:[TOKEN:[[id number]]],PROVIDER:[TOKEN:[[id number]]],PROVIDER:[TOKEN:[[id number]]] PROVIDER:[TOKEN:[[id number]],FOLLOWUP:[2 weeks]],PROVIDER:[TOKEN:[[id number]],SCHEDULEDAPPT:[04/18/2024]],PROVIDER:[TOKEN:[[id number]]]

## 2024-04-12 NOTE — CONSULT NOTE ADULT - ASSESSMENT
32 yo M with PMH DM, HLD, GERD, acromegaly, presenting with headaches. MRI with and without contrast performed on 12/12/23 showing a 1.5 x 1.2 x 1.5cm right sided pituitary macroadenoma. S/p transphenoidal resection of pituitary adenoma (4/10).     #Pituitary macroadenoma s/p  transphenoidal resection of pituitary adenoma  -Management as per Neurosurgery and ENT   -Continue pain and bowel regimen as per NSGY team   -Continue Hydrocortisone 50mg q12   -Will continue CTX while inpt  - Pt w/ valentin splints in place per ENT the pt to Follow up with Dr. Vega in clinic next Thurs 4/18 for removal of doyles   -Continue nasal spray     #Diabetes   -HgbA1c 7.9  -Pt's FS are WNL   - Endocrine following   - Will continue lantus 15 units at bedtime.   -Continue lispro 3 units TIDAC as well as low dose ISS TIDAC    #HLD  -Continue atorvastatin 10mg qhs     #GERD  -Continue PPI     #DVT prop  - SCDs for DVT prophylaxis  and ambulate adlib     #DISPO  - Pt is likely for d/c tomorrow 4/13 as per NSGY team  and ENT     50 minutes spent on total encounter. The necessity of the time spent during the encounter on this date of service was due to:    Review of hospital course, labs, vitals, medical records.  Bedside exam and speaking to the pt with the assistance of  ID #[id number].  Discussed plan of care with neurosurgery service ACP  Documenting the encounter.  32 yo M with PMH DM, HLD, GERD, acromegaly, presenting with headaches. MRI with and without contrast performed on 12/12/23 showing a 1.5 x 1.2 x 1.5cm right sided pituitary macroadenoma. S/p transphenoidal resection of pituitary adenoma (4/10).     #Pituitary macroadenoma s/p  transphenoidal resection of pituitary adenoma  -Management as per Neurosurgery and ENT   -Continue pain and bowel regimen as per NSGY team   -Continue Hydrocortisone 50mg q12   -Will continue CTX while inpt  - Pt w/ valentin splints in place per ENT the pt to Follow up with Dr. Vega in clinic next Thurs 4/18 for removal of doyles   -Continue nasal spray     #Diabetes   -HgbA1c 7.9  -Pt's FS are WNL   - Endocrine following   - Will continue lantus 15 units at bedtime.   -Continue lispro 3 units TIDAC as well as low dose ISS TIDAC    #HLD  -Continue atorvastatin 10mg qhs     #GERD  -Continue PPI     #DVT prop  - SCDs for DVT prophylaxis  and ambulate adlib     #DISPO  - Pt is likely for d/c tomorrow 4/13 as per NSGY team and ENT     50 minutes spent on total encounter. The necessity of the time spent during the encounter on this date of service was due to:    Review of hospital course, labs, vitals, medical records.  Bedside exam and speaking to the pt with the assistance of  ID #[id number].  Discussed plan of care with neurosurgery service ACP  Documenting the encounter.

## 2024-04-12 NOTE — DISCHARGE NOTE PROVIDER - NSDCCPCAREPLAN_GEN_ALL_CORE_FT
PRINCIPAL DISCHARGE DIAGNOSIS  Diagnosis: Pituitary adenoma  Assessment and Plan of Treatment:      PRINCIPAL DISCHARGE DIAGNOSIS  Diagnosis: Pituitary adenoma  Assessment and Plan of Treatment: S/p laparoscopic lumbo-pertioneal shunt clip x3 ligation (4/11)        SECONDARY DISCHARGE DIAGNOSES  Diagnosis: Chronic GERD  Assessment and Plan of Treatment: Continue PPI    Diagnosis: Hyperlipemia  Assessment and Plan of Treatment: Continue atorvastatin    Diagnosis: Diabetes  Assessment and Plan of Treatment: HgbA1c 7.9, inc metformin to 850mg twice a day

## 2024-04-12 NOTE — CONSULT NOTE ADULT - SUBJECTIVE AND OBJECTIVE BOX
Pt seen and evaluated at bedside. Spoke to the patient with the assistance of the  ID #[id number]. In summary the pt is a 32 yo M with PMH DM, HLD, GERD, acromegaly, presenting with headaches. MRI with and without contrast performed on 12/12/23 showing a 1.5 x 1.2 x 1.5cm right sided pituitary macroadenoma. S/p transphenoidal resection of pituitary adenoma (4/10).     PAST MEDICAL & SURGICAL HISTORY:  Diabetes  GERD (gastroesophageal reflux disease)  H/O migraine headache  HLD   Hayfever  No significant past surgical history    Home Medications:  atorvastatin 10 mg oral tablet: 1 tab(s) orally once a day (at bedtime) (10 Apr 2024 06:37)  cyclobenzaprine 10 mg oral tablet: 1 tab(s) orally once a day (10 Apr 2024 06:37)  fluticasone 50 mcg/inh nasal spray: 1 spray(s) in each nostril as needed for  allergy symptoms (10 Apr 2024 06:37)  metFORMIN 500 mg oral tablet: 1 tab(s) orally 2 times a day (10 Apr 2024 06:37)  omeprazole 40 mg oral delayed release capsule: 1 cap(s) orally once a day (10 Apr 2024 06:37)  Ozempic 2 mg/1.5 mL (0.25 mg or 0.5 mg dose) subcutaneous solution: 0.5 milligram(s) subcutaneously every 7 days (09 Apr 2024 16:47)  Tylenol 500 mg oral tablet: 2 tab(s) orally as needed for  headache (10 Apr 2024 06:37)      Allergies: No Known Allergies    VITAL SIGNS, INS/OUTS (last 24 hours):  Vital Signs Last 24 Hrs  T(C): 36.8 (12 Apr 2024 08:27), Max: 37.2 (11 Apr 2024 20:00)  T(F): 98.3 (12 Apr 2024 08:27), Max: 99 (11 Apr 2024 20:00)  HR: 81 (12 Apr 2024 08:27) (74 - 100)  BP: 115/80 (12 Apr 2024 08:27) (115/80 - 135/86)  BP(mean): 91 (11 Apr 2024 16:20) (91 - 92)  RR: 16 (12 Apr 2024 08:27) (16 - 18)  SpO2: 95% (12 Apr 2024 08:27) (92% - 99%)    Parameters below as of 12 Apr 2024 08:27  Patient On (Oxygen Delivery Method): room air    PHYSICAL EXAM:  NAD sitting up in bed; family member at bedside   CTA b/l no wheezing or crackles  NL S1,S2 no mumurs   soft NT/ND + BS no rebound or guarding     BASIC LABS:                        12.9   6.65  )-----------( 270      ( 12 Apr 2024 05:30 )             38.7     04-12    139  |  103  |  19  ----------------------------<  181<H>  4.4   |  26  |  0.74    Ca    9.9      12 Apr 2024 05:30  Phos  4.7     04-12  Mg     2.3     04-12    TPro  6.8  /  Alb  4.2  /  TBili  0.2  /  DBili  x   /  AST  14  /  ALT  17  /  AlkPhos  83  04-10    PT/INR - ( 10 Apr 2024 12:11 )   PT: 13.7 sec;   INR: 1.21   PTT - ( 10 Apr 2024 12:11 )  PTT:29.3 sec      CAPILLARY BLOOD GLUCOSE  POCT Blood Glucose.: 139 mg/dL (12 Apr 2024 06:54)  POCT Blood Glucose.: 151 mg/dL (11 Apr 2024 22:07)  POCT Blood Glucose.: 90 mg/dL (11 Apr 2024 16:50)  POCT Blood Glucose.: 88 mg/dL (11 Apr 2024 15:28)  POCT Blood Glucose.: 204 mg/dL (11 Apr 2024 10:39)    4-10 MRI Brain : There is no significant interval change in a 1.4 x 1.4 x 1.1 (TV X AP X   CC) rounded hypoenhancing sellar mass just to the right of midline   (series 203, image 57 and series 301, image 64). There is marked leftward   deviation of the pituitary stalk. There is no mass effect on the optic   chiasm. While this is not a dedicated pituitary study, there is no   definitive cavernous sinus invasion.    There is otherwise no areas of pathological enhancement elsewhere in the   brain. There are a few tiny scattered foci of T2/FLAIR hyperintensity in   the periventricular and subcortical white matter, nonspecific.    The ventricles and sulci are normal in size and configuration. No   significant mass effect or midline shift. No extra-axial fluid   collection. There is no acute hemorrhage. The vascular flow voids are   present.    The visualized paranasal sinuses are free of mucosal disease. The mastoid   air cells are well-aerated.    CURRENT MEDICATIONS:   acetaminophen     Tablet .. 1000 milliGRAM(s) Oral every 8 hours  atorvastatin 10 milliGRAM(s) Oral at bedtime  benzocaine/menthol Lozenge 1 Lozenge Oral every 6 hours PRN  cefTRIAXone   IVPB 2000 milliGRAM(s) IV Intermittent every 12 hours  hydrocortisone 50 milliGRAM(s) Oral every 12 hours  insulin glargine Injectable (LANTUS) 15 Unit(s) SubCutaneous at bedtime  insulin lispro (ADMELOG) corrective regimen sliding scale   SubCutaneous Before meals and at bedtime  insulin lispro Injectable (ADMELOG) 3 Unit(s) SubCutaneous three times a day before meals  ondansetron Injectable 4 milliGRAM(s) IV Push every 6 hours  oxyCODONE    IR 5 milliGRAM(s) Oral every 4 hours PRN  oxyCODONE    IR 10 milliGRAM(s) Oral every 4 hours PRN  pantoprazole    Tablet 40 milliGRAM(s) Oral before breakfast  polyethylene glycol 3350 17 Gram(s) Oral daily  senna 2 Tablet(s) Oral at bedtime  sodium chloride 0.65% Nasal 1 Spray(s) Both Nostrils every 6 hours

## 2024-04-12 NOTE — DISCHARGE NOTE PROVIDER - CARE PROVIDER_API CALL
Bhargav Graves.  Neurosurgery  [address] [place] [address]  Phone: [phone]  Fax: [phone]  Follow Up Time:     Darci Vega  Otolaryngology  [address]  [place] [address]  Phone: [phone]  Fax: [phone]  Follow Up Time:     Royer Maldonado  Endocrinology/Metab/Diabetes  [address]  [place] [address]  Phone: [phone]  Fax: [phone]  Follow Up Time:    Bhargav Graves.  Neurosurgery  [address] [place] [address]  Phone: [phone]  Fax: [phone]  Follow Up Time: 2 weeks    Darci Vega  Otolaryngology  [address]  [place] [address]  Phone: [phone]  Fax: [phone]  Scheduled Appointment: 04/18/2024    Royer Maldonado  Endocrinology/Metab/Diabetes  [address]  [place] [address]  Phone: [phone]  Fax: [phone]  Follow Up Time:

## 2024-04-12 NOTE — PROGRESS NOTE ADULT - SUBJECTIVE AND OBJECTIVE BOX
HPI:  Information below taken from outpatient records:    Mr. Joseph is a 33 year old male with history of diabetes, HLD presenting with headaches. MRI with and without contrast performed on 12/12/23 showing a 1.5 x 1.2 x 1.5cm right sided pituitary macroadenoma. Presents today with enlargement of his hands/feet and acromegalic features. Having pain that is waking him up. Denies vision changes. Patient is here now for endoscopic endonasal resection of pituitary mass. (10 Apr 2024 06:45)    INTERVAL EVENTS:  c/o mild HA, pressure sensation    HOSPITAL COURSE:  4/10: POD 0 TSP with small CSF leak repaired with nasoseptal flap frozen: pituitary adenoma. Endo consulted. Started on insulin gtt in OR, continued post-op. Started D5/NS.  and 330 for 21:00 and 22:00, ordered BMP and specific gravity. Dc'd D5/NS.   4/11: POD 1 TSP with CSF leak repair. KENYETTA overnight, neuro stable. Na 139, spec grav 1.009,  at midnight. Insulin gtt dc'd. Off bedrest 4/11 12:00pm. SD status. ISS made low dose, lispro premeal decreased to 3u from 5u, HCT decreased to 50q12 from 50q8.  4/12: POD 2. KENYETTA o/n      Vital Signs Last 24 Hrs  T(C): 37.2 (11 Apr 2024 20:00), Max: 37.2 (11 Apr 2024 20:00)  T(F): 99 (11 Apr 2024 20:00), Max: 99 (11 Apr 2024 20:00)  HR: 90 (11 Apr 2024 20:00) (74 - 100)  BP: 135/86 (11 Apr 2024 20:00) (119/75 - 135/86)  BP(mean): 91 (11 Apr 2024 16:20) (91 - 92)  RR: 16 (11 Apr 2024 20:00) (15 - 18)  SpO2: 95% (11 Apr 2024 20:00) (92% - 99%)    Parameters below as of 11 Apr 2024 20:00  Patient On (Oxygen Delivery Method): room air        I&O's Summary    10 Apr 2024 07:01  -  11 Apr 2024 07:00  --------------------------------------------------------  IN: 664.6 mL / OUT: 2945 mL / NET: -2280.4 mL    11 Apr 2024 07:01  -  12 Apr 2024 01:22  --------------------------------------------------------  IN: 100 mL / OUT: 350 mL / NET: -250 mL        PHYSICAL EXAM:  Constitutional: NAD, resting comfortably in bed.   HEENT: Pupils equal, round, reactive to light. EOMI. Face symmetric, tongue midline. Visual fields intact.   Respiratory: No respiratory distress, lungs clear to auscultation bilaterally.   Cardiovascular: RRR, S1, S2.   Gastrointestinal: Abdomen soft, non tender, nondistended.  Neurological:  AAOX3. Opens eyes. Follows commands. Speech clear.  Cranial Nerves: II-XII intact, visual fields intact  Motor: 5/5 power in b/l UE and LE  Sensation: intact to light touch in all extremities  Pronator Drift: none  Dysmetria: not tested   Extremities: Warm, well perfused.    LABS:                        12.7   8.55  )-----------( 279      ( 11 Apr 2024 05:30 )             37.0     04-11    140  |  104  |  10  ----------------------------<  160<H>  3.9   |  26  |  0.72    Ca    9.7      11 Apr 2024 05:30  Phos  6.0     04-11  Mg     2.1     04-11    TPro  6.8  /  Alb  4.2  /  TBili  0.2  /  DBili  x   /  AST  14  /  ALT  17  /  AlkPhos  83  04-10    PT/INR - ( 10 Apr 2024 12:11 )   PT: 13.7 sec;   INR: 1.21          PTT - ( 10 Apr 2024 12:11 )  PTT:29.3 sec  Urinalysis Basic - ( 11 Apr 2024 05:30 )    Color: x / Appearance: x / SG: x / pH: x  Gluc: 160 mg/dL / Ketone: x  / Bili: x / Urobili: x   Blood: x / Protein: x / Nitrite: x   Leuk Esterase: x / RBC: x / WBC x   Sq Epi: x / Non Sq Epi: x / Bacteria: x          CAPILLARY BLOOD GLUCOSE      POCT Blood Glucose.: 151 mg/dL (11 Apr 2024 22:07)  POCT Blood Glucose.: 90 mg/dL (11 Apr 2024 16:50)  POCT Blood Glucose.: 88 mg/dL (11 Apr 2024 15:28)  POCT Blood Glucose.: 204 mg/dL (11 Apr 2024 10:39)  POCT Blood Glucose.: 143 mg/dL (11 Apr 2024 06:33)      Drug Levels: [] N/A    CSF Analysis: [] N/A      Allergies    No Known Allergies    Intolerances      MEDICATIONS:  Antibiotics:  cefTRIAXone   IVPB 2000 milliGRAM(s) IV Intermittent every 12 hours    Neuro:  acetaminophen     Tablet .. 1000 milliGRAM(s) Oral every 8 hours  ondansetron Injectable 4 milliGRAM(s) IV Push every 6 hours  oxyCODONE    IR 10 milliGRAM(s) Oral every 4 hours PRN  oxyCODONE    IR 5 milliGRAM(s) Oral every 4 hours PRN    Anticoagulation:    OTHER:  atorvastatin 10 milliGRAM(s) Oral at bedtime  benzocaine/menthol Lozenge 1 Lozenge Oral every 6 hours PRN  hydrocortisone 50 milliGRAM(s) Oral every 12 hours  insulin glargine Injectable (LANTUS) 15 Unit(s) SubCutaneous at bedtime  insulin lispro (ADMELOG) corrective regimen sliding scale   SubCutaneous Before meals and at bedtime  insulin lispro Injectable (ADMELOG) 3 Unit(s) SubCutaneous three times a day before meals  pantoprazole    Tablet 40 milliGRAM(s) Oral before breakfast  polyethylene glycol 3350 17 Gram(s) Oral daily  senna 2 Tablet(s) Oral at bedtime    IVF:    CULTURES:    RADIOLOGY & ADDITIONAL TESTS:      ASSESSMENT:  32 yo M with PMH DM, HLD, GERD, acromegaly, presenting with headaches. MRI with and without contrast performed on 12/12/23 showing a 1.5 x 1.2 x 1.5cm right sided pituitary macroadenoma. S/p transphenoidal resection of pituitary adenoma with small CSF leak repaired with nasoseptal flap (4/10).     Neuro:  - neuro/vital checks q8h  - pain control: Tylenol prn, oxy 5/10 prn  - skull base precautions  - no imaging needed: MRI outpt    ENT:  - BL valentin splints in place, will be removed outpatient   - CTX while in hospital   - saline nasal spray q6    Cardio:  - SBP <140  - HLD: lipitor 10mg     Pulm:  - RA    GI:  - CCD  - bowel regimen   - GERD: protonix (takes omeprazole at home)    Endo:  - A1c: 7.9  - TSH 0.25, FT4 1.570; f/u endo labs  - DM: ISS (low dose), lantus 15 qhs, lispro 3u  - Hydrocortisone taper 50q12, f/u endo recs    Renal:   - IVL, voiding  - DI watch    Heme:  - SCDs for DVT prophylaxis     ID:  - CTX per ENT while in hospital  - Afebrile     Dispo: NSICU status, full code, PT/OT no needs     D/W Dr. Graves  HPI:  Information below taken from outpatient records:    Mr. Joseph is a 33 year old male with history of diabetes, HLD presenting with headaches. MRI with and without contrast performed on 12/12/23 showing a 1.5 x 1.2 x 1.5cm right sided pituitary macroadenoma. Presents today with enlargement of his hands/feet and acromegalic features. Having pain that is waking him up. Denies vision changes. Patient is here now for endoscopic endonasal resection of pituitary mass. (10 Apr 2024 06:45)    INTERVAL EVENTS:  c/o mild HA, pressure sensation    HOSPITAL COURSE:  4/10: POD 0 Transphenoidal resection of pituitary tumor, frozen: pituitary adenoma. Endo consulted. Started on insulin gtt in OR, continued post-op. Started D5/NS.  and 330 for 21:00 and 22:00, ordered BMP and specific gravity. Dc'd D5/NS.   4/11: POD 1 TSP with CSF leak repair. KENYETTA overnight, neuro stable. Na 139, spec grav 1.009,  at midnight. Insulin gtt dc'd. Off bedrest 4/11 12:00pm. SD status. ISS made low dose, lispro premeal decreased to 3u from 5u, HCT decreased to 50q12 from 50q8.  4/12: POD 2. KENYETTA o/n      Vital Signs Last 24 Hrs  T(C): 37.2 (11 Apr 2024 20:00), Max: 37.2 (11 Apr 2024 20:00)  T(F): 99 (11 Apr 2024 20:00), Max: 99 (11 Apr 2024 20:00)  HR: 90 (11 Apr 2024 20:00) (74 - 100)  BP: 135/86 (11 Apr 2024 20:00) (119/75 - 135/86)  BP(mean): 91 (11 Apr 2024 16:20) (91 - 92)  RR: 16 (11 Apr 2024 20:00) (15 - 18)  SpO2: 95% (11 Apr 2024 20:00) (92% - 99%)    Parameters below as of 11 Apr 2024 20:00  Patient On (Oxygen Delivery Method): room air        I&O's Summary    10 Apr 2024 07:01  -  11 Apr 2024 07:00  --------------------------------------------------------  IN: 664.6 mL / OUT: 2945 mL / NET: -2280.4 mL    11 Apr 2024 07:01  -  12 Apr 2024 01:22  --------------------------------------------------------  IN: 100 mL / OUT: 350 mL / NET: -250 mL        PHYSICAL EXAM:  Constitutional: NAD, resting comfortably in bed.   HEENT: Pupils equal, round, reactive to light. EOMI. Face symmetric, tongue midline. Visual fields intact.   Respiratory: No respiratory distress, lungs clear to auscultation bilaterally.   Cardiovascular: RRR, S1, S2.   Gastrointestinal: Abdomen soft, non tender, nondistended.  Neurological:  AAOX3. Opens eyes. Follows commands. Speech clear.  Cranial Nerves: II-XII intact, visual fields intact  Motor: 5/5 power in b/l UE and LE  Sensation: intact to light touch in all extremities  Pronator Drift: none  Dysmetria: not tested   Extremities: Warm, well perfused.    LABS:                        12.7   8.55  )-----------( 279      ( 11 Apr 2024 05:30 )             37.0     04-11    140  |  104  |  10  ----------------------------<  160<H>  3.9   |  26  |  0.72    Ca    9.7      11 Apr 2024 05:30  Phos  6.0     04-11  Mg     2.1     04-11    TPro  6.8  /  Alb  4.2  /  TBili  0.2  /  DBili  x   /  AST  14  /  ALT  17  /  AlkPhos  83  04-10    PT/INR - ( 10 Apr 2024 12:11 )   PT: 13.7 sec;   INR: 1.21          PTT - ( 10 Apr 2024 12:11 )  PTT:29.3 sec  Urinalysis Basic - ( 11 Apr 2024 05:30 )    Color: x / Appearance: x / SG: x / pH: x  Gluc: 160 mg/dL / Ketone: x  / Bili: x / Urobili: x   Blood: x / Protein: x / Nitrite: x   Leuk Esterase: x / RBC: x / WBC x   Sq Epi: x / Non Sq Epi: x / Bacteria: x          CAPILLARY BLOOD GLUCOSE      POCT Blood Glucose.: 151 mg/dL (11 Apr 2024 22:07)  POCT Blood Glucose.: 90 mg/dL (11 Apr 2024 16:50)  POCT Blood Glucose.: 88 mg/dL (11 Apr 2024 15:28)  POCT Blood Glucose.: 204 mg/dL (11 Apr 2024 10:39)  POCT Blood Glucose.: 143 mg/dL (11 Apr 2024 06:33)      Drug Levels: [] N/A    CSF Analysis: [] N/A      Allergies    No Known Allergies    Intolerances      MEDICATIONS:  Antibiotics:  cefTRIAXone   IVPB 2000 milliGRAM(s) IV Intermittent every 12 hours    Neuro:  acetaminophen     Tablet .. 1000 milliGRAM(s) Oral every 8 hours  ondansetron Injectable 4 milliGRAM(s) IV Push every 6 hours  oxyCODONE    IR 10 milliGRAM(s) Oral every 4 hours PRN  oxyCODONE    IR 5 milliGRAM(s) Oral every 4 hours PRN    Anticoagulation:    OTHER:  atorvastatin 10 milliGRAM(s) Oral at bedtime  benzocaine/menthol Lozenge 1 Lozenge Oral every 6 hours PRN  hydrocortisone 50 milliGRAM(s) Oral every 12 hours  insulin glargine Injectable (LANTUS) 15 Unit(s) SubCutaneous at bedtime  insulin lispro (ADMELOG) corrective regimen sliding scale   SubCutaneous Before meals and at bedtime  insulin lispro Injectable (ADMELOG) 3 Unit(s) SubCutaneous three times a day before meals  pantoprazole    Tablet 40 milliGRAM(s) Oral before breakfast  polyethylene glycol 3350 17 Gram(s) Oral daily  senna 2 Tablet(s) Oral at bedtime    IVF:    CULTURES:    RADIOLOGY & ADDITIONAL TESTS:      ASSESSMENT:  34 yo M with PMH DM, HLD, GERD, acromegaly, presenting with headaches. MRI with and without contrast performed on 12/12/23 showing a 1.5 x 1.2 x 1.5cm right sided pituitary macroadenoma. S/p transphenoidal resection of pituitary adenoma (4/10).     Neuro:  - neuro/vital checks q8h  - pain control: Tylenol prn, oxy 5/10 prn  - skull base precautions  - no imaging needed: MRI outpt    ENT:  - BL valentin splints in place, will be removed outpatient   - CTX while in hospital   - saline nasal spray q6    Cardio:  - SBP <140  - HLD: lipitor 10mg     Pulm:  - RA    GI:  - CCD  - bowel regimen   - GERD: protonix (takes omeprazole at home)    Endo:  - A1c: 7.9  - TSH 0.25, FT4 1.570; f/u endo labs  - DM: ISS (low dose), lantus 15 qhs, lispro 3u  - Hydrocortisone taper 50q12, f/u endo recs    Renal:   - IVL, voiding  - DI watch    Heme:  - SCDs for DVT prophylaxis     ID:  - CTX per ENT while in hospital  - Afebrile     Dispo: NSICU status, full code, PT/OT no needs     D/W Dr. Graves

## 2024-04-12 NOTE — DISCHARGE NOTE PROVIDER - NSDCCPTREATMENT_GEN_ALL_CORE_FT
PRINCIPAL PROCEDURE  Procedure: Endoscopic transphenoidal or transnasal resection of pituitary tumor  Findings and Treatment: and low flow CSF leak repaired with nasoseptal flap     PRINCIPAL PROCEDURE  Procedure: Endoscopic transphenoidal or transnasal resection of pituitary tumor  Findings and Treatment: S/p laparoscopic lumbo-pertioneal shunt clip x3 ligation (4/11)

## 2024-04-12 NOTE — DISCHARGE NOTE PROVIDER - NSDCFUADDINST_GEN_ALL_CORE_FT
A. POSTOP INSTRUCTIONS  - Do not take Advil (ibuprofen), Motrin (naproxen) or Aspirin for pain, as they can cause bleeding. Only use Tylenol (acetaminophen) or oxycodone as directed on the  bottle.  -You may experience constipation while on oxycodone. Please take stool softeners as needed. Do not drive or operative machinery while on oxycodone.   -Continue your home medications including metformin, omeprazole, and lipitor  -If you experience excessive fatigue, weakness, or excessive thirst and urination, inform your doctor immediately.  -You may be more comfortable sleeping with your head elevated on a few pillows while you have the nasal packing.  -Report fevers, chills night sweats, bleeding, worsening headache, nausea/vomiting to the doctor immediately and go to your nearest ER  -You will continue hydrocortisone as written    B. FOLLOW UP  2. You will see Dr. Vega's team regularly, about once a week, for several weeks after surgery to evaluate your sinus healing. You have valentin splints in place. Please use nasal sprays as needed.   3. Follow up with your endocrinologist within 2 weeks of surgery for lab work.  4. Follow up with the ophthalmologist within 2 months of surgery.  5. You will need a postop MRI sella scan outpatient. Call Dr. Graves's office to schedule your MRI.    C. ACTIVITY LEVEL  1. Fatigue is common following surgery. Listen to your body and rest if you feel tired.  2. You should get up and walk around every hour during the daytime to keep your blood circulating.  3. No bending, lifting or straining for the first 4 weeks, but walking is recommended.  4. No nose blowing or sneezing for 1 month after surgery.  5. Drink plenty of water. Do not use a straw.  If you have any questions or concerns, please call our office [phone] A. POSTOP INSTRUCTIONS  - Do not take Advil (ibuprofen), Motrin (naproxen) or Aspirin for pain, as they can cause bleeding. Only use Tylenol (acetaminophen) or oxycodone as directed on the  bottle.  -Continue your home medications including metformin (which is at a new, increased dose), omeprazole, and lipitor  -If you experience excessive fatigue, weakness, or excessive thirst and urination, inform your doctor immediately.  -You may be more comfortable sleeping with your head elevated on a few pillows while you have the nasal packing.  -Report severe headaches, changes in vision, excessive clear sinus drainage, excessive salty taste in your mouth, fevers >100.4, chills, night sweats, bleeding, nausea/vomiting to the doctor immediately and go to your nearest ER  -You will continue hydrocortisone as written- 20mg in the morning and 10mg at night (2 pills in AM, 1 in PM).     B. FOLLOW UP  1. You will need a postop MRI sella scan outpatient. Call Dr. Graves's office to schedule your MRI.  2. You will see Dr. Vega's team regularly, about once a week, for several weeks after surgery to evaluate your sinus healing. You have valentin splints in place. Please use nasal sprays as directed.   3. Follow up with your endocrinologist within 2 weeks of surgery for lab work.  4. Follow up with the ophthalmologist within 2 months of surgery.    C. ACTIVITY LEVEL  1. Fatigue is common following surgery. Listen to your body and rest if you feel tired.  2. You should get up and walk around every hour during the daytime to keep your blood circulating.  3. No bending, lifting or straining for the first 4 weeks, but walking is recommended.  4. No nose blowing or sneezing (with your mouth closed) for 1 month after surgery.  5. Drink plenty of water. Do not use a straw.    If you have any questions or concerns, please call our office [phone]; if you have any questions within 24 hours of discharge please call the inpatient number, [phone]

## 2024-04-12 NOTE — PROGRESS NOTE ADULT - SUBJECTIVE AND OBJECTIVE BOX
OTOLARYNGOLOGY (ENT) PROGRESS NOTE    PATIENT: ZIA GIBBS  MRN: [id number]  : 86  [id number]  DATE OF SERVICE:  24  			         ID:ZIA GIBBS is a 33M s/p TSPR for growth-hormone secreting pituitary adenoma, low flow CSF leak intraop. Closed with middle turbinate mucosal graft, adheris 4/10.    Subjective/ Interval:   : Patient seen and examined at bedside. AVSS, NAEON. No drainage from nares.   : Patient seen and examined at bedside. AVSS, NAEON. No drainage from nares, vision stable     ALLERGIES:  No Known Allergies      MEDICATIONS:  Antiinfectives:   cefTRIAXone   IVPB 2000 milliGRAM(s) IV Intermittent every 12 hours    IV fluids:    Hematologic/Anticoagulation:    Pain medications/Neuro:  acetaminophen     Tablet .. 1000 milliGRAM(s) Oral every 8 hours  ondansetron Injectable 4 milliGRAM(s) IV Push every 6 hours  oxyCODONE    IR 10 milliGRAM(s) Oral every 4 hours PRN  oxyCODONE    IR 5 milliGRAM(s) Oral every 4 hours PRN    Endocrine Medications:   atorvastatin 10 milliGRAM(s) Oral at bedtime  hydrocortisone 50 milliGRAM(s) Oral every 12 hours  insulin glargine Injectable (LANTUS) 15 Unit(s) SubCutaneous at bedtime  insulin lispro (ADMELOG) corrective regimen sliding scale   SubCutaneous Before meals and at bedtime  insulin lispro Injectable (ADMELOG) 3 Unit(s) SubCutaneous three times a day before meals    All other standing medications:   pantoprazole    Tablet 40 milliGRAM(s) Oral before breakfast  polyethylene glycol 3350 17 Gram(s) Oral daily  senna 2 Tablet(s) Oral at bedtime    All other PRN medications:  benzocaine/menthol Lozenge 1 Lozenge Oral every 6 hours PRN    Vital Signs Last 24 Hrs  T(C): 36.8 (2024 05:00), Max: 37.2 (2024 20:00)  T(F): 98.2 (2024 05:00), Max: 99 (2024 20:00)  HR: 93 (2024 05:00) (74 - 100)  BP: 116/76 (2024 05:00) (116/76 - 135/86)  BP(mean): 91 (2024 16:20) (91 - 92)  RR: 18 (2024 05:00) (16 - 18)  SpO2: 92% (2024 05:00) (92% - 99%)    Parameters below as of 2024 05:00  Patient On (Oxygen Delivery Method): room air          - @ 07:01  -  - @ 07:00  --------------------------------------------------------  IN:    IV PiggyBack: 100 mL    Oral Fluid: 480 mL  Total IN: 580 mL    OUT:    Voided (mL): 1300 mL  Total OUT: 1300 mL    Total NET: -720 mL              PHYSICAL EXAM:  GEN: appears stated age, NAD  NEURO: alert & oriented x   HEENT: face symmetric, oropharynx moist without exudates, CN VII/XI/XII intact, valentin splints in place, no drainage from nares  CVS: regular rate and rhythm  Pulm: normal respiratory excursions, not tachypneic, no labored breathing  Abd: non-distended  Ext: moving all four extremities, no peripheral edema noted          LABS                       12.9   6.65  )-----------( 270      ( 2024 05:30 )             38.7    04-12    139  |  103  |  x   ----------------------------<  181<H>  4.4   |  26  |  0.74    Ca    9.9      2024 05:30  Phos  6.0     04-11  Mg     2.3     04-12    TPro  6.8  /  Alb  4.2  /  TBili  0.2  /  DBili  x   /  AST  14  /  ALT  17  /  AlkPhos  83  04-10         Coagulation Studies-   PT/INR - ( 10 Apr 2024 12:11 )   PT: 13.7 sec;   INR: 1.21          PTT - ( 10 Apr 2024 12:11 )  PTT:29.3 sec  Urinalysis Basic - ( 2024 05:30 )    Color: x / Appearance: x / SG: x / pH: x  Gluc: 181 mg/dL / Ketone: x  / Bili: x / Urobili: x   Blood: x / Protein: x / Nitrite: x   Leuk Esterase: x / RBC: x / WBC x   Sq Epi: x / Non Sq Epi: x / Bacteria: x      Endocrine Panel-  Calcium: 9.9 mg/dL ( @ 05:30)                MICROBIOLOGY:

## 2024-04-12 NOTE — DISCHARGE NOTE PROVIDER - NSDCMRMEDTOKEN_GEN_ALL_CORE_FT
atorvastatin 10 mg oral tablet: 1 tab(s) orally once a day (at bedtime)  cyclobenzaprine 10 mg oral tablet: 1 tab(s) orally once a day  fluticasone 50 mcg/inh nasal spray: 1 spray(s) in each nostril as needed for  allergy symptoms  metFORMIN 500 mg oral tablet: 1 tab(s) orally 2 times a day  omeprazole 40 mg oral delayed release capsule: 1 cap(s) orally once a day  Ozempic 2 mg/1.5 mL (0.25 mg or 0.5 mg dose) subcutaneous solution: 0.5 milligram(s) subcutaneously every 7 days  Tylenol 500 mg oral tablet: 2 tab(s) orally as needed for  headache   atorvastatin 10 mg oral tablet: 1 tab(s) orally once a day (at bedtime)  cyclobenzaprine 10 mg oral tablet: 1 tab(s) orally once a day  fluticasone 50 mcg/inh nasal spray: 1 spray(s) in each nostril as needed for  allergy symptoms  hydrocortisone 10 mg oral tablet: 1 tab(s) orally 2 times a day Take 2 tabs in the morning and 1 tab at night  metFORMIN 850 mg oral tablet: 1 tab(s) orally 2 times a day  omeprazole 40 mg oral delayed release capsule: 1 cap(s) orally once a day  Ozempic 2 mg/1.5 mL (0.25 mg or 0.5 mg dose) subcutaneous solution: 0.5 milligram(s) subcutaneously every 7 days  sodium chloride 0.65% nasal spray: 1 spray(s) nasal every 6 hours 1 spray in each nostril every 6 hours  Tylenol 500 mg oral tablet: 2 tab(s) orally as needed for  headache

## 2024-04-12 NOTE — DISCHARGE NOTE PROVIDER - NSDCFUSCHEDAPPT_GEN_ALL_CORE_FT
Bhargav Graves Physician [facility]  NEUROSURG [address] [facility] [address]  Scheduled Appointment: 04/22/2024

## 2024-04-12 NOTE — PROGRESS NOTE ADULT - ATTENDING COMMENTS
Patient has no headache nor visual field disturbance. Glucose level was 152 last night and today 143-204. I agree with treating him with 15 units Lantus Insulin and 5 units pre-meal Lispro Insulin. Glucose was 152 last night and today 143-204. Free T4 is 1.57 with TSH 0.25. Sodium level is 140. GH and other lab are pending. 24 hour output was 1600 mL/
Serum sodium is 139. GH returns 1.86. Testosterone is still low 126. Hydrocortisone is being tapered to every 12 hours.Output seems alright. glucose levels 157-139 last night and today 1602/ I agree with continuing 15 units Lantus and 5 units pre-meal Lispro Insulin. Testosterone discussion initiated.

## 2024-04-12 NOTE — DISCHARGE NOTE PROVIDER - CARE PROVIDERS DIRECT ADDRESSES
[email or web address]

## 2024-04-12 NOTE — PROGRESS NOTE ADULT - SUBJECTIVE AND OBJECTIVE BOX
SUBJECTIVE / INTERVAL HPI: Patient was seen and examined this morning.     Overnight events:  1.3L overnight urine output  950ml for 12 hours  Na today 139    CAPILLARY BLOOD GLUCOSE & INSULIN RECEIVED  128 mg/dL (04-10 @ 21:09)  158 mg/dL (04-10 @ 22:03)  162 mg/dL (04-10 @ 23:32)  151 mg/dL (04-11 @ 00:07)  143 mg/dL (04-11 @ 06:33)  204 mg/dL (04-11 @ 10:39)  88 mg/dL (04-11 @ 15:28)  90 mg/dL (04-11 @ 16:50)  151 mg/dL (04-11 @ 22:07) 15  139 mg/dL (04-12 @ 06:54)      REVIEW OF SYSTEMS  Constitutional:  Negative fever, chills   Cardiovascular:  Negative for chest pain or palpitations.  Respiratory:  Negative for cough, wheezing, or shortness of breath.    Gastrointestinal:  Negative for nausea, vomiting, diarrhea, constipation, or abdominal pain.  Genitourinary:  Negative frequency, urgency or dysuria.  Neurologic:  No headache, confusion, dizziness, lightheadedness.    PHYSICAL EXAM  Vital Signs Last 24 Hrs  T(C): 36.8 (12 Apr 2024 05:00), Max: 37.2 (11 Apr 2024 20:00)  T(F): 98.2 (12 Apr 2024 05:00), Max: 99 (11 Apr 2024 20:00)  HR: 93 (12 Apr 2024 05:00) (74 - 100)  BP: 116/76 (12 Apr 2024 05:00) (116/76 - 135/86)  BP(mean): 91 (11 Apr 2024 16:20) (91 - 92)  RR: 18 (12 Apr 2024 05:00) (16 - 18)  SpO2: 92% (12 Apr 2024 05:00) (92% - 99%)    Parameters below as of 12 Apr 2024 05:00  Patient On (Oxygen Delivery Method): room air        Constitutional: Awake, alert, in no acute distress.   HEENT: Normocephalic, atraumatic, AXEL.  Respiratory: Lungs clear to ausculation bilaterally.   Cardiovascular: regular rhythm, normal S1 and S2, no audible murmurs.   GI: soft, non-tender, non-distended, bowel sounds present.  Extremities: No lower extremity edema.     LABS  CBC - WBC/HGB/HTC/PLT: 6.65/12.9/38.7/270 (04-12-24)  BMP - Na/K/Cl/Bicarb/BUN/Cr/Gluc/AG/eGFR: 139/4.4/103/26/19/0.74/181/10/121 (04-12-24)  Ca - 9.9 (04-12-24)  Phos - 4.7 (04-12-24)  Mg - 2.3 (04-12-24)  LFT - Alb/Tprot/Tbili/Dbili/AlkPhos/ALT/AST: 4.2/--/0.2/--/83/17/14 (04-10-24)  PT/aPTT/INR: 13.7/29.3/1.21 (04-10-24)   Thyroid Stimulating Hormone, Serum: 0.250 (04-11-24)  Total T4/Free T4: --/1.570 (04-11-24)      04-11-24 @ 07:01  -  04-12-24 @ 07:00  --------------------------------------------------------  IN: 580 mL / OUT: 1300 mL / NET: -720 mL        MEDICATIONS  MEDICATIONS  (STANDING):  acetaminophen     Tablet .. 1000 milliGRAM(s) Oral every 8 hours  atorvastatin 10 milliGRAM(s) Oral at bedtime  cefTRIAXone   IVPB 2000 milliGRAM(s) IV Intermittent every 12 hours  hydrocortisone 50 milliGRAM(s) Oral every 12 hours  insulin glargine Injectable (LANTUS) 15 Unit(s) SubCutaneous at bedtime  insulin lispro (ADMELOG) corrective regimen sliding scale   SubCutaneous Before meals and at bedtime  insulin lispro Injectable (ADMELOG) 3 Unit(s) SubCutaneous three times a day before meals  ondansetron Injectable 4 milliGRAM(s) IV Push every 6 hours  pantoprazole    Tablet 40 milliGRAM(s) Oral before breakfast  polyethylene glycol 3350 17 Gram(s) Oral daily  senna 2 Tablet(s) Oral at bedtime  sodium chloride 0.65% Nasal 1 Spray(s) Both Nostrils every 6 hours    MEDICATIONS  (PRN):  benzocaine/menthol Lozenge 1 Lozenge Oral every 6 hours PRN Sore Throat  oxyCODONE    IR 5 milliGRAM(s) Oral every 4 hours PRN Moderate Pain (4 - 6)  oxyCODONE    IR 10 milliGRAM(s) Oral every 4 hours PRN Severe Pain (7 - 10)    ASSESSMENT / RECOMMENDATIONS    Mr. Alban Gómez is a 33-year-old man with acromegaly and type 2 diabetes mellitus, now s/p transphenoidal resection of pituitary tumor (4/10/24). Endocrinology is following for recommendations regarding postoperative management    A1C: 7.9 %  BUN: 19  Creatinine: 0.74  GFR: 121  Weight: 75.3  BMI: 29.4      # Acromegaly / Pituitary adenoma status post transphenoidal resection  - Most recent labs showed IGFT-1 1314, ACTH 69, GH 12.80, TSH 2.35, FSH 7.5, prolactin 8.1, LH 2.1. Total testosterone 103, cortisol PM 19.2.   - Repeat labs (4/11/24) showed TSH 0.25. Free T4 of 1.57, GH 1.86, f/u IGF-1 and total testosterone 126   - He remains hemodynamically stable. DECREASE hydrocortisone to 50 mg every 12 hours for now.     # Type 2 diabetes mellitus with hyperglycemia  - His glucose dropped significantly after having lunch today. He reports eating most of his meal which contained mashed potatoes. Therefore, will recommend to decrease premeal insulin.   - Please continue lantus 15 units at bedtime.   - keep lispro 3 units before each meal.  - keep lispro sliding scale to a low dose before meals and at bedtime.  - Patient's fingerstick glucose goal is 100-180 mg/dL.    - Discharge recommendations to be discussed.   - Patient can follow up at discharge with [facility] by calling [phone] to make an appointment.      Case discussed with Dr. Maldonado. Primary team updated    Ammy Rivas  Endocrinology Fellow    Service Pager: [phone]    SUBJECTIVE / INTERVAL HPI: Patient was seen and examined this morning.     Overnight events:   [id number]  1.3L overnight urine output  950ml for 12 hours  Na today 139  pt not telling nurse to record the urine  states he got up 4 times last night and drinking adequate urine    CAPILLARY BLOOD GLUCOSE & INSULIN RECEIVED  128 mg/dL (04-10 @ 21:09)  158 mg/dL (04-10 @ 22:03)  162 mg/dL (04-10 @ 23:32)  151 mg/dL (04-11 @ 00:07)  143 mg/dL (04-11 @ 06:33)  204 mg/dL (04-11 @ 10:39)  88 mg/dL (04-11 @ 15:28)  90 mg/dL (04-11 @ 16:50)  151 mg/dL (04-11 @ 22:07) 15  139 mg/dL (04-12 @ 06:54)  162    REVIEW OF SYSTEMS  Constitutional:  Negative fever, chills   Cardiovascular:  Negative for chest pain or palpitations.  Respiratory:  Negative for cough, wheezing, or shortness of breath.    Gastrointestinal:  Negative for nausea, vomiting, diarrhea, constipation, or abdominal pain.  Genitourinary:  Negative frequency, urgency or dysuria.  Neurologic:  No headache, confusion, dizziness, lightheadedness.    PHYSICAL EXAM  Vital Signs Last 24 Hrs  T(C): 36.8 (12 Apr 2024 05:00), Max: 37.2 (11 Apr 2024 20:00)  T(F): 98.2 (12 Apr 2024 05:00), Max: 99 (11 Apr 2024 20:00)  HR: 93 (12 Apr 2024 05:00) (74 - 100)  BP: 116/76 (12 Apr 2024 05:00) (116/76 - 135/86)  BP(mean): 91 (11 Apr 2024 16:20) (91 - 92)  RR: 18 (12 Apr 2024 05:00) (16 - 18)  SpO2: 92% (12 Apr 2024 05:00) (92% - 99%)    Parameters below as of 12 Apr 2024 05:00  Patient On (Oxygen Delivery Method): room air        Constitutional: Awake, alert, in no acute distress.   HEENT: Normocephalic, atraumatic, AXEL.  Respiratory: Lungs clear to ausculation bilaterally.   Cardiovascular: regular rhythm, normal S1 and S2, no audible murmurs.   GI: soft, non-tender, non-distended, bowel sounds present.  Extremities: No lower extremity edema.     LABS  CBC - WBC/HGB/HTC/PLT: 6.65/12.9/38.7/270 (04-12-24)  BMP - Na/K/Cl/Bicarb/BUN/Cr/Gluc/AG/eGFR: 139/4.4/103/26/19/0.74/181/10/121 (04-12-24)  Ca - 9.9 (04-12-24)  Phos - 4.7 (04-12-24)  Mg - 2.3 (04-12-24)  LFT - Alb/Tprot/Tbili/Dbili/AlkPhos/ALT/AST: 4.2/--/0.2/--/83/17/14 (04-10-24)  PT/aPTT/INR: 13.7/29.3/1.21 (04-10-24)   Thyroid Stimulating Hormone, Serum: 0.250 (04-11-24)  Total T4/Free T4: --/1.570 (04-11-24)      04-11-24 @ 07:01  -  04-12-24 @ 07:00  --------------------------------------------------------  IN: 580 mL / OUT: 1300 mL / NET: -720 mL        MEDICATIONS  MEDICATIONS  (STANDING):  acetaminophen     Tablet .. 1000 milliGRAM(s) Oral every 8 hours  atorvastatin 10 milliGRAM(s) Oral at bedtime  cefTRIAXone   IVPB 2000 milliGRAM(s) IV Intermittent every 12 hours  hydrocortisone 50 milliGRAM(s) Oral every 12 hours  insulin glargine Injectable (LANTUS) 15 Unit(s) SubCutaneous at bedtime  insulin lispro (ADMELOG) corrective regimen sliding scale   SubCutaneous Before meals and at bedtime  insulin lispro Injectable (ADMELOG) 3 Unit(s) SubCutaneous three times a day before meals  ondansetron Injectable 4 milliGRAM(s) IV Push every 6 hours  pantoprazole    Tablet 40 milliGRAM(s) Oral before breakfast  polyethylene glycol 3350 17 Gram(s) Oral daily  senna 2 Tablet(s) Oral at bedtime  sodium chloride 0.65% Nasal 1 Spray(s) Both Nostrils every 6 hours    MEDICATIONS  (PRN):  benzocaine/menthol Lozenge 1 Lozenge Oral every 6 hours PRN Sore Throat  oxyCODONE    IR 5 milliGRAM(s) Oral every 4 hours PRN Moderate Pain (4 - 6)  oxyCODONE    IR 10 milliGRAM(s) Oral every 4 hours PRN Severe Pain (7 - 10)    ASSESSMENT / RECOMMENDATIONS    Mr. Alban Gómez is a 33-year-old man with acromegaly and type 2 diabetes mellitus, now s/p transphenoidal resection of pituitary tumor (4/10/24). Endocrinology is following for recommendations regarding postoperative management    A1C: 7.9 %  BUN: 19  Creatinine: 0.74  GFR: 121  Weight: 75.3  BMI: 29.4      # Acromegaly / Pituitary adenoma status post transphenoidal resection  - Most recent labs showed IGFT-1 1314, ACTH 69, GH 12.80, TSH 2.35, FSH 7.5, prolactin 8.1, LH 2.1. Total testosterone 103, cortisol PM 19.2.   - Repeat labs (4/11/24) showed TSH 0.25. Free T4 of 1.57, GH 1.86, f/u IGF-1 and total testosterone 126   - He remains hemodynamically stable. DECREASE hydrocortisone to 25 mg every 12 hours for now.   - repeat TSH and Free T4 tomorrow - likely be developing central hypothyroidism  - discussed with patient about low total testosterone - outpatient management  - explained to pt that he needs to tell the nurse for every urine output  - check BMP tonight    # Type 2 diabetes mellitus with hyperglycemia  - Please continue lantus 15 units at bedtime.   - keep lispro 3 units before each meal.  - keep lispro sliding scale to a low dose before meals and at bedtime.  - Patient's fingerstick glucose goal is 100-180 mg/dL.    - Discharge recommendations to be discussed.   - Patient can follow up at discharge with [facility] by calling [phone] to make an appointment.      Case discussed with Dr. Maldonado. Primary team updated    Ammy Rivas  Endocrinology Fellow    Service Pager: [phone]    SUBJECTIVE / INTERVAL HPI: Patient was seen and examined this morning.     Overnight events:   [id number]  1.3L overnight urine output  950ml for 12 hours  Na today 139  pt not telling nurse to record the urine  states he got up 4 times last night and drinking adequate urine    CAPILLARY BLOOD GLUCOSE & INSULIN RECEIVED  128 mg/dL (04-10 @ 21:09)  158 mg/dL (04-10 @ 22:03)  162 mg/dL (04-10 @ 23:32)  151 mg/dL (04-11 @ 00:07)  143 mg/dL (04-11 @ 06:33)  204 mg/dL (04-11 @ 10:39)  88 mg/dL (04-11 @ 15:28)  90 mg/dL (04-11 @ 16:50)  151 mg/dL (04-11 @ 22:07) 15  139 mg/dL (04-12 @ 06:54)  162    REVIEW OF SYSTEMS  Constitutional:  Negative fever, chills   Cardiovascular:  Negative for chest pain or palpitations.  Respiratory:  Negative for cough, wheezing, or shortness of breath.    Gastrointestinal:  Negative for nausea, vomiting, diarrhea, constipation, or abdominal pain.  Genitourinary:  Negative frequency, urgency or dysuria.  Neurologic:  No headache, confusion, dizziness, lightheadedness.    PHYSICAL EXAM  Vital Signs Last 24 Hrs  T(C): 36.8 (12 Apr 2024 05:00), Max: 37.2 (11 Apr 2024 20:00)  T(F): 98.2 (12 Apr 2024 05:00), Max: 99 (11 Apr 2024 20:00)  HR: 93 (12 Apr 2024 05:00) (74 - 100)  BP: 116/76 (12 Apr 2024 05:00) (116/76 - 135/86)  BP(mean): 91 (11 Apr 2024 16:20) (91 - 92)  RR: 18 (12 Apr 2024 05:00) (16 - 18)  SpO2: 92% (12 Apr 2024 05:00) (92% - 99%)    Parameters below as of 12 Apr 2024 05:00  Patient On (Oxygen Delivery Method): room air        Constitutional: Awake, alert, in no acute distress.   HEENT: Normocephalic, atraumatic, AXEL.  Respiratory: Lungs clear to ausculation bilaterally.   Cardiovascular: regular rhythm, normal S1 and S2, no audible murmurs.   GI: soft, non-tender, non-distended, bowel sounds present.  Extremities: No lower extremity edema.     LABS  CBC - WBC/HGB/HTC/PLT: 6.65/12.9/38.7/270 (04-12-24)  BMP - Na/K/Cl/Bicarb/BUN/Cr/Gluc/AG/eGFR: 139/4.4/103/26/19/0.74/181/10/121 (04-12-24)  Ca - 9.9 (04-12-24)  Phos - 4.7 (04-12-24)  Mg - 2.3 (04-12-24)  LFT - Alb/Tprot/Tbili/Dbili/AlkPhos/ALT/AST: 4.2/--/0.2/--/83/17/14 (04-10-24)  PT/aPTT/INR: 13.7/29.3/1.21 (04-10-24)   Thyroid Stimulating Hormone, Serum: 0.250 (04-11-24)  Total T4/Free T4: --/1.570 (04-11-24)      04-11-24 @ 07:01  -  04-12-24 @ 07:00  --------------------------------------------------------  IN: 580 mL / OUT: 1300 mL / NET: -720 mL        MEDICATIONS  MEDICATIONS  (STANDING):  acetaminophen     Tablet .. 1000 milliGRAM(s) Oral every 8 hours  atorvastatin 10 milliGRAM(s) Oral at bedtime  cefTRIAXone   IVPB 2000 milliGRAM(s) IV Intermittent every 12 hours  hydrocortisone 50 milliGRAM(s) Oral every 12 hours  insulin glargine Injectable (LANTUS) 15 Unit(s) SubCutaneous at bedtime  insulin lispro (ADMELOG) corrective regimen sliding scale   SubCutaneous Before meals and at bedtime  insulin lispro Injectable (ADMELOG) 3 Unit(s) SubCutaneous three times a day before meals  ondansetron Injectable 4 milliGRAM(s) IV Push every 6 hours  pantoprazole    Tablet 40 milliGRAM(s) Oral before breakfast  polyethylene glycol 3350 17 Gram(s) Oral daily  senna 2 Tablet(s) Oral at bedtime  sodium chloride 0.65% Nasal 1 Spray(s) Both Nostrils every 6 hours    MEDICATIONS  (PRN):  benzocaine/menthol Lozenge 1 Lozenge Oral every 6 hours PRN Sore Throat  oxyCODONE    IR 5 milliGRAM(s) Oral every 4 hours PRN Moderate Pain (4 - 6)  oxyCODONE    IR 10 milliGRAM(s) Oral every 4 hours PRN Severe Pain (7 - 10)    ASSESSMENT / RECOMMENDATIONS    Mr. Alban Gómez is a 33-year-old man with acromegaly and type 2 diabetes mellitus, now s/p transphenoidal resection of pituitary tumor (4/10/24). Endocrinology is following for recommendations regarding postoperative management    A1C: 7.9 %  BUN: 19  Creatinine: 0.74  GFR: 121  Weight: 75.3  BMI: 29.4      # Acromegaly / Pituitary adenoma status post transphenoidal resection  - Most recent labs showed IGFT-1 1314, ACTH 69, GH 12.80, TSH 2.35, FSH 7.5, prolactin 8.1, LH 2.1. Total testosterone 103, cortisol PM 19.2.   - Repeat labs (4/11/24) showed TSH 0.25. Free T4 of 1.57, GH 1.86, f/u IGF-1 and total testosterone 126   - depressed TSH likely affected by hydrocortisone replacement  - He remains hemodynamically stable. DECREASE hydrocortisone to 25 mg every 12 hours for now.   - explained to pt that he needs to tell the nurse for every urine output  - check BMP tonight    # Hypogonadism  - total testosterone 126 post-op, 100 prior to surgery  - primary vs secondary etiology  - will repeat LH and FSH tomorrow  - will discuss    # Type 2 diabetes mellitus with hyperglycemia  - Please continue lantus 15 units at bedtime.   - keep lispro 3 units before each meal.  - keep lispro sliding scale to a low dose before meals and at bedtime.  - Patient's fingerstick glucose goal is 100-180 mg/dL.    - Discharge recommendations to be discussed.   - Patient can follow up at discharge with [facility] by calling [phone] to make an appointment.      Case discussed with Dr. Maldonado. Primary team updated    Ammy Rivas  Endocrinology Fellow    Service Pager: [phone]    SUBJECTIVE / INTERVAL HPI: Patient was seen and examined this morning.     Overnight events:   [id number]  1.3L overnight urine output  950ml for 12 hours  Na today 139  pt not telling nurse to record the urine  states he got up 4 times last night and drinking adequate urine    CAPILLARY BLOOD GLUCOSE & INSULIN RECEIVED  128 mg/dL (04-10 @ 21:09)  158 mg/dL (04-10 @ 22:03)  162 mg/dL (04-10 @ 23:32)  151 mg/dL (04-11 @ 00:07)  143 mg/dL (04-11 @ 06:33)  204 mg/dL (04-11 @ 10:39)  88 mg/dL (04-11 @ 15:28)  90 mg/dL (04-11 @ 16:50)  151 mg/dL (04-11 @ 22:07) 15  139 mg/dL (04-12 @ 06:54)  162    REVIEW OF SYSTEMS  Constitutional:  Negative fever, chills   Cardiovascular:  Negative for chest pain or palpitations.  Respiratory:  Negative for cough, wheezing, or shortness of breath.    Gastrointestinal:  Negative for nausea, vomiting, diarrhea, constipation, or abdominal pain.  Genitourinary:  Negative frequency, urgency or dysuria.  Neurologic:  No headache, confusion, dizziness, lightheadedness.    PHYSICAL EXAM  Vital Signs Last 24 Hrs  T(C): 36.8 (12 Apr 2024 05:00), Max: 37.2 (11 Apr 2024 20:00)  T(F): 98.2 (12 Apr 2024 05:00), Max: 99 (11 Apr 2024 20:00)  HR: 93 (12 Apr 2024 05:00) (74 - 100)  BP: 116/76 (12 Apr 2024 05:00) (116/76 - 135/86)  BP(mean): 91 (11 Apr 2024 16:20) (91 - 92)  RR: 18 (12 Apr 2024 05:00) (16 - 18)  SpO2: 92% (12 Apr 2024 05:00) (92% - 99%)    Parameters below as of 12 Apr 2024 05:00  Patient On (Oxygen Delivery Method): room air        Constitutional: Awake, alert, in no acute distress.   HEENT: Normocephalic, atraumatic, AXEL.  Respiratory: Lungs clear to ausculation bilaterally.   Cardiovascular: regular rhythm, normal S1 and S2, no audible murmurs.   GI: soft, non-tender, non-distended, bowel sounds present.  Extremities: No lower extremity edema.     LABS  CBC - WBC/HGB/HTC/PLT: 6.65/12.9/38.7/270 (04-12-24)  BMP - Na/K/Cl/Bicarb/BUN/Cr/Gluc/AG/eGFR: 139/4.4/103/26/19/0.74/181/10/121 (04-12-24)  Ca - 9.9 (04-12-24)  Phos - 4.7 (04-12-24)  Mg - 2.3 (04-12-24)  LFT - Alb/Tprot/Tbili/Dbili/AlkPhos/ALT/AST: 4.2/--/0.2/--/83/17/14 (04-10-24)  PT/aPTT/INR: 13.7/29.3/1.21 (04-10-24)   Thyroid Stimulating Hormone, Serum: 0.250 (04-11-24)  Total T4/Free T4: --/1.570 (04-11-24)      04-11-24 @ 07:01  -  04-12-24 @ 07:00  --------------------------------------------------------  IN: 580 mL / OUT: 1300 mL / NET: -720 mL        MEDICATIONS  MEDICATIONS  (STANDING):  acetaminophen     Tablet .. 1000 milliGRAM(s) Oral every 8 hours  atorvastatin 10 milliGRAM(s) Oral at bedtime  cefTRIAXone   IVPB 2000 milliGRAM(s) IV Intermittent every 12 hours  hydrocortisone 50 milliGRAM(s) Oral every 12 hours  insulin glargine Injectable (LANTUS) 15 Unit(s) SubCutaneous at bedtime  insulin lispro (ADMELOG) corrective regimen sliding scale   SubCutaneous Before meals and at bedtime  insulin lispro Injectable (ADMELOG) 3 Unit(s) SubCutaneous three times a day before meals  ondansetron Injectable 4 milliGRAM(s) IV Push every 6 hours  pantoprazole    Tablet 40 milliGRAM(s) Oral before breakfast  polyethylene glycol 3350 17 Gram(s) Oral daily  senna 2 Tablet(s) Oral at bedtime  sodium chloride 0.65% Nasal 1 Spray(s) Both Nostrils every 6 hours    MEDICATIONS  (PRN):  benzocaine/menthol Lozenge 1 Lozenge Oral every 6 hours PRN Sore Throat  oxyCODONE    IR 5 milliGRAM(s) Oral every 4 hours PRN Moderate Pain (4 - 6)  oxyCODONE    IR 10 milliGRAM(s) Oral every 4 hours PRN Severe Pain (7 - 10)    ASSESSMENT / RECOMMENDATIONS    Mr. Alban Gómez is a 33-year-old man with acromegaly and type 2 diabetes mellitus, now s/p transphenoidal resection of pituitary tumor (4/10/24). Endocrinology is following for recommendations regarding postoperative management    A1C: 7.9 %  BUN: 19  Creatinine: 0.74  GFR: 121  Weight: 75.3  BMI: 29.4      # Acromegaly / Pituitary adenoma status post transphenoidal resection  - Most recent labs showed IGFT-1 1314, ACTH 69, GH 12.80, TSH 2.35, FSH 7.5, prolactin 8.1, LH 2.1. Total testosterone 103, cortisol PM 19.2.   - Repeat labs (4/11/24) showed TSH 0.25. Free T4 of 1.57, GH 1.86, f/u IGF-1 and total testosterone 126   - depressed TSH likely affected by hydrocortisone replacement  - He remains hemodynamically stable. DECREASE hydrocortisone to 25 mg every 12 hours for now hydrocortisone 20 AM and 10 PM tomorrow if it is hemodynamically stable  - explained to pt that he needs to tell the nurse for every urine output  - check BMP tonight  - please call on the day of discharge for final discharge recs    # Hypogonadism  - total testosterone 126 post-op, 100 prior to surgery  - primary vs secondary etiology  - will repeat LH and FSH tomorrow  - spoke to wilfrid about plans for future kids and the couple said they may plan for more kids  - pt maybe a candidate for Clomiphene if LH comes back low tomorrow     # Type 2 diabetes mellitus with hyperglycemia  - Please continue lantus 15 units at bedtime.   - keep lispro 3 units before each meal.  - keep lispro sliding scale to a low dose before meals and at bedtime.  - Patient's fingerstick glucose goal is 100-180 mg/dL.    - Discharge recommendations to be discussed.   - Patient can follow up at discharge with [facility] by calling [phone] to make an appointment.      Case discussed with Dr. Maldonado. Primary team updated    Ammy Rivas  Endocrinology Fellow    Service Pager: [phone]

## 2024-04-12 NOTE — DISCHARGE NOTE PROVIDER - HOSPITAL COURSE
HPI:  34 yo M with PMH DM, HLD, GERD, acromegaly, presenting with headaches. MRI with and without contrast performed on 12/12/23 showing a 1.5 x 1.2 x 1.5cm right sided pituitary macroadenoma. S/p transphenoidal resection of pituitary adenoma with small CSF leak repaired with nasoseptal flap (4/10).     Hospital Course:  4/10: POD 0 TSP with small CSF leak repaired with nasoseptal flap frozen: pituitary adenoma. Endo consulted. Started on insulin gtt in OR, continued post-op. Started D5/NS.  and 330 for 21:00 and 22:00, ordered BMP and specific gravity. Dc'd D5/NS.   4/11: POD 1 TSP with CSF leak repair. KENYETTA overnight, neuro stable. Na 139, spec grav 1.009,  at midnight. Insulin gtt dc'd. Off bedrest 4/11 12:00pm. SD status. ISS made low dose, lispro premeal decreased to 3u from 5u, HCT decreased to 50q12 from 50q8.  4/12: POD 2. KENYETTA overnight. Neuro exam stable.    Patient evaluated by PT/OT who recommended: home, no needs  Patient is going home? rehab? hospice? Facility Name:     Hospital course c/b:     Exam on day of discharge:    Checklist:   - Obtained follow up appointment from NP  - Reviewed final recommendations of inpatient consults  - review discharge planning on provider handoff  - post op imaging completed  - Neurologically stable for discharge  - Vitals stable for discharge   - Afebrile for discharge  - WBC is stable  - Sodium level is normal  - Pain is adequately controlled  - Pt has PICC/walker/brace/collar   - LACE score (10 or > needs PCP apt)   - stroke patient? Discharge NIHSS score   HPI:  32 yo M with PMH DM, HLD, GERD, acromegaly, presenting with headaches. MRI with and without contrast performed on 12/12/23 showing a 1.5 x 1.2 x 1.5cm right sided pituitary macroadenoma. S/p transphenoidal resection of pituitary adenoma with small CSF leak repaired with nasoseptal flap (4/10).     Hospital Course:  4/10: POD 0 TSP with small CSF leak repaired with nasoseptal flap frozen: pituitary adenoma. Endo consulted. Started on insulin gtt in OR, continued post-op. Started D5/NS.  and 330 for 21:00 and 22:00, ordered BMP and specific gravity. Dc'd D5/NS.   4/11: POD 1 TSP with CSF leak repair. KENYETTA overnight, neuro stable. Na 139, spec grav 1.009,  at midnight. Insulin gtt dc'd. Off bedrest 4/11 12:00pm. SD status. ISS made low dose, lispro premeal decreased to 3u from 5u, HCT decreased to 50q12 from 50q8.  4/12: POD 2. KENYETTA overnight. Neuro exam stable.    Patient evaluated by PT/OT who recommended: home, no needs  Patient is going home    Exam on day of discharge:  Neurological: AAOX3. Opens eyes. Follows commands. Speech clear.  Cranial Nerves: II-XII intact, visual fields intact b/l  Motor: 5/5 power in b/l UE and LE  Sensation: intact to light touch in all extremities  Pronator Drift: none  Extremities: Warm, well perfused.     HPI:  34 yo M with PMH DM, HLD, GERD, acromegaly, presenting with headaches. MRI with and without contrast performed on 12/12/23 showing a 1.5 x 1.2 x 1.5cm right sided pituitary macroadenoma. S/p transphenoidal resection of pituitary adenoma (4/10).     Hospital Course:  4/10: POD 0 transphenoidal resection of pituitary tumor frozen: pituitary adenoma. Endo consulted. Started on insulin gtt in OR, continued post-op. Started D5/NS.  and 330 for 21:00 and 22:00, ordered BMP and specific gravity. Dc'd D5/NS.   4/11: POD 1 TSP with CSF leak repair. KENYETTA overnight, neuro stable. Na 139, spec grav 1.009,  at midnight. Insulin gtt dc'd. Off bedrest 4/11 12:00pm. SD status. ISS made low dose, lispro premeal decreased to 3u from 5u, HCT decreased to 50q12 from 50q8.  4/12: POD 2. KENYETTA overnight. Neuro exam stable.    Patient evaluated by PT/OT who recommended: home, no needs  Patient is going home    Exam on day of discharge:  Neurological: AAOX3. Opens eyes. Follows commands. Speech clear.  Cranial Nerves: II-XII intact, visual fields intact b/l  Motor: 5/5 power in b/l UE and LE  Sensation: intact to light touch in all extremities  Pronator Drift: none  Extremities: Warm, well perfused.     HPI:  34 yo M with PMH DM, HLD, GERD, acromegaly, presenting with headaches. MRI with and without contrast performed on 12/12/23 showing a 1.5 x 1.2 x 1.5cm right sided pituitary macroadenoma. S/p transphenoidal resection of pituitary adenoma (4/10).     Hospital Course:  4/10: POD 0 TSP with small CSF leak repaired with nasoseptal flap frozen: pituitary adenoma. Endo consulted. Started on insulin gtt in OR, continued post-op. Started D5/NS.  and 330 for 21:00 and 22:00, ordered BMP and specific gravity. Dc'd D5/NS.   4/11: POD 1 TSP with CSF leak repair. KENYETTA overnight, neuro stable. Na 139, spec grav 1.009,  at midnight. Insulin gtt dc'd. Off bedrest 4/11 12:00pm. SD status. ISS made low dose, lispro premeal decreased to 3u from 5u, HCT decreased to 50q12 from 50q8.  4/12: POD 2. KENYETTA overnight. Neuro exam stable. Hydrocortisone decreased to 25 BID per endocrinology.   4/13: POD3, KENYETTA overnight. IGF-1 880, ordering repeat for follow up. Endo recs for discharge: increase metformin to 850 BID and continue ozempic as prescribed. Stable for discharge home.    Patient evaluated by PT/OT who recommended: home, no needs  Patient is going home    Exam on day of discharge:  General: NAD, pt is comfortably sitting up in bed, on room air  HEENT: CN II-XII intact, PERRL 3mm briskly reactive, EOMI b/l, face symmetric, tongue midline, neck FROM; b/l valentin splints in place  Cardiovascular: RRR, S1, S2  Respiratory: non-labored breathing on RA, chest rise symmetric  GI: abd soft, NTND   Neuro: A&Ox3, No aphasia, speech clear, no dysmetria, no pronator drift. Follows commands.  WELCH x4 spontaneously, 5/5 strength in all extremities throughout. Sensation intact  Extremities: distal pulses 2+ x4  Wound/incision: valentin splint b/l nares  Drain: n/a

## 2024-04-13 ENCOUNTER — TRANSCRIPTION ENCOUNTER (OUTPATIENT)
Age: 34
End: 2024-04-13

## 2024-04-13 ENCOUNTER — NON-APPOINTMENT (OUTPATIENT)
Age: 34
End: 2024-04-13

## 2024-04-13 VITALS
SYSTOLIC BLOOD PRESSURE: 115 MMHG | DIASTOLIC BLOOD PRESSURE: 79 MMHG | OXYGEN SATURATION: 93 % | HEART RATE: 84 BPM | TEMPERATURE: 98 F | RESPIRATION RATE: 16 BRPM

## 2024-04-13 LAB
ANION GAP SERPL CALC-SCNC: 10 MMOL/L — SIGNIFICANT CHANGE UP (ref 5–17)
BUN SERPL-MCNC: 19 MG/DL — SIGNIFICANT CHANGE UP (ref 7–23)
CALCIUM SERPL-MCNC: 10.1 MG/DL — SIGNIFICANT CHANGE UP (ref 8.4–10.5)
CHLORIDE SERPL-SCNC: 103 MMOL/L — SIGNIFICANT CHANGE UP (ref 96–108)
CO2 SERPL-SCNC: 28 MMOL/L — SIGNIFICANT CHANGE UP (ref 22–31)
CREAT SERPL-MCNC: 0.79 MG/DL — SIGNIFICANT CHANGE UP (ref 0.5–1.3)
EGFR: 119 ML/MIN/1.73M2 — SIGNIFICANT CHANGE UP
FSH SERPL-MCNC: 9.34 IU/L — SIGNIFICANT CHANGE UP
GLUCOSE BLDC GLUCOMTR-MCNC: 221 MG/DL — HIGH (ref 70–99)
GLUCOSE BLDC GLUCOMTR-MCNC: 92 MG/DL — SIGNIFICANT CHANGE UP (ref 70–99)
GLUCOSE SERPL-MCNC: 109 MG/DL — HIGH (ref 70–99)
HCT VFR BLD CALC: 40.2 % — SIGNIFICANT CHANGE UP (ref 39–50)
HGB BLD-MCNC: 13.1 G/DL — SIGNIFICANT CHANGE UP (ref 13–17)
IGF BP1 SERPL-MCNC: 880 NG/ML — HIGH (ref 95–290)
LH SERPL-ACNC: 5.32 IU/L — SIGNIFICANT CHANGE UP
MAGNESIUM SERPL-MCNC: 2.3 MG/DL — SIGNIFICANT CHANGE UP (ref 1.6–2.6)
MCHC RBC-ENTMCNC: 30.7 PG — SIGNIFICANT CHANGE UP (ref 27–34)
MCHC RBC-ENTMCNC: 32.6 GM/DL — SIGNIFICANT CHANGE UP (ref 32–36)
MCV RBC AUTO: 94.1 FL — SIGNIFICANT CHANGE UP (ref 80–100)
NRBC # BLD: 0 /100 WBCS — SIGNIFICANT CHANGE UP (ref 0–0)
PHOSPHATE SERPL-MCNC: 5.6 MG/DL — HIGH (ref 2.5–4.5)
PLATELET # BLD AUTO: 268 K/UL — SIGNIFICANT CHANGE UP (ref 150–400)
POTASSIUM SERPL-MCNC: 3.9 MMOL/L — SIGNIFICANT CHANGE UP (ref 3.5–5.3)
POTASSIUM SERPL-SCNC: 3.9 MMOL/L — SIGNIFICANT CHANGE UP (ref 3.5–5.3)
RBC # BLD: 4.27 M/UL — SIGNIFICANT CHANGE UP (ref 4.2–5.8)
RBC # FLD: 11.9 % — SIGNIFICANT CHANGE UP (ref 10.3–14.5)
SODIUM SERPL-SCNC: 141 MMOL/L — SIGNIFICANT CHANGE UP (ref 135–145)
T4 FREE SERPL-MCNC: 1.65 NG/DL — SIGNIFICANT CHANGE UP (ref 0.93–1.7)
TESTOST FREE+TOTAL PANEL SERPL-MCNC: 224 NG/DL — LOW (ref 300–836)
TSH SERPL-MCNC: 2.13 UIU/ML — SIGNIFICANT CHANGE UP (ref 0.27–4.2)
WBC # BLD: 6.7 K/UL — SIGNIFICANT CHANGE UP (ref 3.8–10.5)
WBC # FLD AUTO: 6.7 K/UL — SIGNIFICANT CHANGE UP (ref 3.8–10.5)

## 2024-04-13 PROCEDURE — 80053 COMPREHEN METABOLIC PANEL: CPT

## 2024-04-13 PROCEDURE — 81003 URINALYSIS AUTO W/O SCOPE: CPT

## 2024-04-13 PROCEDURE — 83735 ASSAY OF MAGNESIUM: CPT

## 2024-04-13 PROCEDURE — 36415 COLL VENOUS BLD VENIPUNCTURE: CPT

## 2024-04-13 PROCEDURE — 84295 ASSAY OF SERUM SODIUM: CPT

## 2024-04-13 PROCEDURE — 83001 ASSAY OF GONADOTROPIN (FSH): CPT

## 2024-04-13 PROCEDURE — 88342 IMHCHEM/IMCYTCHM 1ST ANTB: CPT

## 2024-04-13 PROCEDURE — 82533 TOTAL CORTISOL: CPT

## 2024-04-13 PROCEDURE — 85730 THROMBOPLASTIN TIME PARTIAL: CPT

## 2024-04-13 PROCEDURE — C1889: CPT

## 2024-04-13 PROCEDURE — 84443 ASSAY THYROID STIM HORMONE: CPT

## 2024-04-13 PROCEDURE — 97161 PT EVAL LOW COMPLEX 20 MIN: CPT

## 2024-04-13 PROCEDURE — 88341 IMHCHEM/IMCYTCHM EA ADD ANTB: CPT

## 2024-04-13 PROCEDURE — 85018 HEMOGLOBIN: CPT

## 2024-04-13 PROCEDURE — A9585: CPT

## 2024-04-13 PROCEDURE — 84305 ASSAY OF SOMATOMEDIN: CPT

## 2024-04-13 PROCEDURE — 85027 COMPLETE CBC AUTOMATED: CPT

## 2024-04-13 PROCEDURE — 88360 TUMOR IMMUNOHISTOCHEM/MANUAL: CPT

## 2024-04-13 PROCEDURE — 83003 ASSAY GROWTH HORMONE (HGH): CPT

## 2024-04-13 PROCEDURE — 86850 RBC ANTIBODY SCREEN: CPT

## 2024-04-13 PROCEDURE — 88333 PATH CONSLTJ SURG CYTO XM 1: CPT

## 2024-04-13 PROCEDURE — 82962 GLUCOSE BLOOD TEST: CPT

## 2024-04-13 PROCEDURE — 99024 POSTOP FOLLOW-UP VISIT: CPT

## 2024-04-13 PROCEDURE — 70552 MRI BRAIN STEM W/DYE: CPT | Mod: MC

## 2024-04-13 PROCEDURE — 88331 PATH CONSLTJ SURG 1 BLK 1SPC: CPT

## 2024-04-13 PROCEDURE — 80048 BASIC METABOLIC PNL TOTAL CA: CPT

## 2024-04-13 PROCEDURE — 82947 ASSAY GLUCOSE BLOOD QUANT: CPT

## 2024-04-13 PROCEDURE — 88305 TISSUE EXAM BY PATHOLOGIST: CPT

## 2024-04-13 PROCEDURE — 99232 SBSQ HOSP IP/OBS MODERATE 35: CPT

## 2024-04-13 PROCEDURE — 86901 BLOOD TYPING SEROLOGIC RH(D): CPT

## 2024-04-13 PROCEDURE — 82330 ASSAY OF CALCIUM: CPT

## 2024-04-13 PROCEDURE — 84100 ASSAY OF PHOSPHORUS: CPT

## 2024-04-13 PROCEDURE — 97166 OT EVAL MOD COMPLEX 45 MIN: CPT

## 2024-04-13 PROCEDURE — 86900 BLOOD TYPING SEROLOGIC ABO: CPT

## 2024-04-13 PROCEDURE — 83002 ASSAY OF GONADOTROPIN (LH): CPT

## 2024-04-13 PROCEDURE — 84403 ASSAY OF TOTAL TESTOSTERONE: CPT

## 2024-04-13 PROCEDURE — 85610 PROTHROMBIN TIME: CPT

## 2024-04-13 PROCEDURE — 84132 ASSAY OF SERUM POTASSIUM: CPT

## 2024-04-13 PROCEDURE — 84439 ASSAY OF FREE THYROXINE: CPT

## 2024-04-13 PROCEDURE — 83036 HEMOGLOBIN GLYCOSYLATED A1C: CPT

## 2024-04-13 RX ORDER — HYDROCORTISONE 20 MG
1 TABLET ORAL
Qty: 90 | Refills: 0
Start: 2024-04-13

## 2024-04-13 RX ORDER — SODIUM CHLORIDE 0.65 %
1 AEROSOL, SPRAY (ML) NASAL
Qty: 1 | Refills: 0
Start: 2024-04-13 | End: 2024-04-26

## 2024-04-13 RX ORDER — METFORMIN HYDROCHLORIDE 850 MG/1
1 TABLET ORAL
Qty: 60 | Refills: 1
Start: 2024-04-13

## 2024-04-13 RX ORDER — METFORMIN HYDROCHLORIDE 850 MG/1
1 TABLET ORAL
Refills: 0 | DISCHARGE

## 2024-04-13 RX ADMIN — Medication 1000 MILLIGRAM(S): at 13:06

## 2024-04-13 RX ADMIN — Medication 1 SPRAY(S): at 05:27

## 2024-04-13 RX ADMIN — Medication 1 SPRAY(S): at 12:33

## 2024-04-13 RX ADMIN — Medication 2: at 12:37

## 2024-04-13 RX ADMIN — POLYETHYLENE GLYCOL 3350 17 GRAM(S): 17 POWDER, FOR SOLUTION ORAL at 12:33

## 2024-04-13 RX ADMIN — Medication 1000 MILLIGRAM(S): at 06:27

## 2024-04-13 RX ADMIN — Medication 3 UNIT(S): at 12:37

## 2024-04-13 RX ADMIN — CEFTRIAXONE 100 MILLIGRAM(S): 500 INJECTION, POWDER, FOR SOLUTION INTRAMUSCULAR; INTRAVENOUS at 00:15

## 2024-04-13 RX ADMIN — Medication 1000 MILLIGRAM(S): at 05:27

## 2024-04-13 RX ADMIN — PANTOPRAZOLE SODIUM 40 MILLIGRAM(S): 20 TABLET, DELAYED RELEASE ORAL at 06:00

## 2024-04-13 RX ADMIN — CEFTRIAXONE 100 MILLIGRAM(S): 500 INJECTION, POWDER, FOR SOLUTION INTRAMUSCULAR; INTRAVENOUS at 12:33

## 2024-04-13 RX ADMIN — Medication 25 MILLIGRAM(S): at 05:27

## 2024-04-13 NOTE — PROGRESS NOTE ADULT - SUBJECTIVE AND OBJECTIVE BOX
OTOLARYNGOLOGY (ENT) PROGRESS NOTE    PATIENT: ZIA GIBBS  MRN: [id number]  : 86  [id number]  DATE OF SERVICE:  24  			         ID:ZIA GIBBS is a 33M s/p TSPR for growth-hormone secreting pituitary adenoma, low flow CSF leak intraop. Closed with middle turbinate mucosal graft, adheris 4/10.    Subjective/ Interval:   : Patient seen and examined at bedside. AVSS, NAEON. No drainage from nares.   : Patient seen and examined at bedside. AVSS, NAEON. No drainage from nares, vision stable   : Patient seen and examined at bedside. AVSS, NAEON. No drainage from nares, vision stable    ALLERGIES:  No Known Allergies      MEDICATIONS:  Antiinfectives:   cefTRIAXone   IVPB 2000 milliGRAM(s) IV Intermittent every 12 hours    IV fluids:    Hematologic/Anticoagulation:    Pain medications/Neuro:  acetaminophen     Tablet .. 1000 milliGRAM(s) Oral every 8 hours  oxyCODONE    IR 5 milliGRAM(s) Oral every 4 hours PRN  oxyCODONE    IR 10 milliGRAM(s) Oral every 4 hours PRN    Endocrine Medications:   atorvastatin 10 milliGRAM(s) Oral at bedtime  hydrocortisone 25 milliGRAM(s) Oral every 12 hours  insulin glargine Injectable (LANTUS) 15 Unit(s) SubCutaneous at bedtime  insulin lispro (ADMELOG) corrective regimen sliding scale   SubCutaneous Before meals and at bedtime  insulin lispro Injectable (ADMELOG) 3 Unit(s) SubCutaneous three times a day before meals    All other standing medications:   pantoprazole    Tablet 40 milliGRAM(s) Oral before breakfast  polyethylene glycol 3350 17 Gram(s) Oral daily  senna 2 Tablet(s) Oral at bedtime  sodium chloride 0.65% Nasal 1 Spray(s) Both Nostrils every 6 hours    All other PRN medications:  benzocaine/menthol Lozenge 1 Lozenge Oral every 6 hours PRN    Vital Signs Last 24 Hrs  T(C): 36.6 (2024 08:03), Max: 36.8 (2024 05:01)  T(F): 97.9 (2024 08:03), Max: 98.3 (2024 05:01)  HR: 84 (2024 08:03) (73 - 84)  BP: 115/79 (2024 08:03) (115/79 - 128/84)  BP(mean): --  RR: 16 (2024 08:03) (16 - 18)  SpO2: 93% (2024 08:03) (93% - 97%)    Parameters below as of 2024 08:03  Patient On (Oxygen Delivery Method): room air          04-12 @ 07:01  -  - @ 07:00  --------------------------------------------------------  IN:    Oral Fluid: 480 mL  Total IN: 480 mL    OUT:    Voided (mL): 1900 mL  Total OUT: 1900 mL    Total NET: -1420 mL        PHYSICAL EXAM:  GEN: appears stated age, NAD  NEURO: alert & oriented x /  HEENT: face symmetric, oropharynx moist without exudates, CN VII/XI/XII intact, valentin splints in place, no drainage from nares  CVS: regular rate and rhythm  Pulm: normal respiratory excursions, not tachypneic, no labored breathing  Abd: non-distended  Ext: moving all four extremities, no peripheral edema noted       LABS                       13.1   6.70  )-----------( 268      ( 2024 05:30 )             40.2    04-13    141  |  103  |  19  ----------------------------<  109<H>  3.9   |  28  |  0.79    Ca    10.1      2024 05:30  Phos  5.6     04-13  Mg     2.3     04-13           Coagulation Studies-     Urinalysis Basic - ( 2024 05:30 )    Color: x / Appearance: x / SG: x / pH: x  Gluc: 109 mg/dL / Ketone: x  / Bili: x / Urobili: x   Blood: x / Protein: x / Nitrite: x   Leuk Esterase: x / RBC: x / WBC x   Sq Epi: x / Non Sq Epi: x / Bacteria: x      Endocrine Panel-  Calcium: 10.1 mg/dL ( @ 05:30)  Calcium: 9.2 mg/dL ( @ 19:07)

## 2024-04-13 NOTE — PROGRESS NOTE ADULT - SUBJECTIVE AND OBJECTIVE BOX
HPI:    Mr. Joseph is a 33 year old male with history of diabetes, HLD presenting with headaches. MRI with and without contrast performed on 12/12/23 showing a 1.5 x 1.2 x 1.5cm right sided pituitary macroadenoma. Presents today with enlargement of his hands/feet and acromegalic features. Having pain that is waking him up. Denies vision changes. Patient is here now for endoscopic endonasal resection of pituitary mass. (10 Apr 2024 06:45)      Subjective:  Patient admits to mild head pain.    Hospital course:  4/10: POD 0 TSP with small CSF leak repaired with nasoseptal flap frozen: pituitary adenoma. Endo consulted. Started on insulin gtt in OR, continued post-op. Started D5/NS.  and 330 for 21:00 and 22:00, ordered BMP and specific gravity. Dc'd D5/NS.   4/11: POD 1 TSP with CSF leak repair. KENYETTA overnight, neuro stable. Na 139, spec grav 1.009,  at midnight. Insulin gtt dc'd. Off bedrest 4/11 12:00pm. SD status. ISS made low dose, lispro premeal decreased to 3u from 5u, HCT decreased to 50q12 from 50q8.  4/12: POD 2. KENYETTA overnight. Neuro exam stable. Hydrocortisone decreased to 25 BID per endocrinology.   4/13: POD3, KENYETTA overnight  Vital Signs Last 24 Hrs  T(C): 36.6 (12 Apr 2024 20:00), Max: 36.8 (12 Apr 2024 05:00)  T(F): 97.8 (12 Apr 2024 20:00), Max: 98.3 (12 Apr 2024 08:27)  HR: 82 (12 Apr 2024 20:00) (73 - 93)  BP: 123/80 (12 Apr 2024 20:00) (115/80 - 128/84)  BP(mean): --  RR: 16 (12 Apr 2024 20:00) (16 - 18)  SpO2: 95% (12 Apr 2024 20:00) (92% - 96%)    Parameters below as of 12 Apr 2024 20:00  Patient On (Oxygen Delivery Method): room air        I&O's Summary    11 Apr 2024 07:01  -  12 Apr 2024 07:00  --------------------------------------------------------  IN: 580 mL / OUT: 1300 mL / NET: -720 mL    12 Apr 2024 07:01  -  13 Apr 2024 00:12  --------------------------------------------------------  IN: 240 mL / OUT: 850 mL / NET: -610 mL        PHYSICAL EXAM:  General: patient seen laying supine in bed in NAD  Neuro: AAOx3, follows commands, opens eyes spontaneously, speech clear and fluent, CNII-XI grossly intact, face symmetric, no pronator drift, strength 5/5 b/l upper extremities and lower extremities, sensation intact to light touch throughout  HEENT: PERRL, EOMI  Neck: supple  Cardiac: RRR, S1S2  Pulmonary: chest rise symmetric  Abdomen: soft, nontender, nondistended  Ext: perfusing well  Skin: warm, dry              LABS:                        12.9   6.65  )-----------( 270      ( 12 Apr 2024 05:30 )             38.7     04-12    138  |  100  |  23  ----------------------------<  162<H>  3.9   |  26  |  1.03    Ca    9.2      12 Apr 2024 19:07  Phos  4.7     04-12  Mg     2.3     04-12        Urinalysis Basic - ( 12 Apr 2024 19:07 )    Color: x / Appearance: x / SG: x / pH: x  Gluc: 162 mg/dL / Ketone: x  / Bili: x / Urobili: x   Blood: x / Protein: x / Nitrite: x   Leuk Esterase: x / RBC: x / WBC x   Sq Epi: x / Non Sq Epi: x / Bacteria: x          CAPILLARY BLOOD GLUCOSE      POCT Blood Glucose.: 245 mg/dL (12 Apr 2024 22:03)  POCT Blood Glucose.: 85 mg/dL (12 Apr 2024 17:26)  POCT Blood Glucose.: 162 mg/dL (12 Apr 2024 12:22)  POCT Blood Glucose.: 139 mg/dL (12 Apr 2024 06:54)      Drug Levels: [] N/A    CSF Analysis: [] N/A      Allergies    No Known Allergies    Intolerances      MEDICATIONS:  Antibiotics:  cefTRIAXone   IVPB 2000 milliGRAM(s) IV Intermittent every 12 hours    Neuro:  acetaminophen     Tablet .. 1000 milliGRAM(s) Oral every 8 hours  oxyCODONE    IR 5 milliGRAM(s) Oral every 4 hours PRN  oxyCODONE    IR 10 milliGRAM(s) Oral every 4 hours PRN    Anticoagulation:    OTHER:  atorvastatin 10 milliGRAM(s) Oral at bedtime  benzocaine/menthol Lozenge 1 Lozenge Oral every 6 hours PRN  hydrocortisone 25 milliGRAM(s) Oral every 12 hours  insulin glargine Injectable (LANTUS) 15 Unit(s) SubCutaneous at bedtime  insulin lispro (ADMELOG) corrective regimen sliding scale   SubCutaneous Before meals and at bedtime  insulin lispro Injectable (ADMELOG) 3 Unit(s) SubCutaneous three times a day before meals  pantoprazole    Tablet 40 milliGRAM(s) Oral before breakfast  polyethylene glycol 3350 17 Gram(s) Oral daily  senna 2 Tablet(s) Oral at bedtime  sodium chloride 0.65% Nasal 1 Spray(s) Both Nostrils every 6 hours    IVF:    CULTURES:    RADIOLOGY & ADDITIONAL TESTS:    D35.2    Handoff    MEWS Score    Diabetes    GERD (gastroesophageal reflux disease)    H/O headache    HLD (hyperlipidemia)    Hayfever    Pituitary neoplasm    Pituitary neoplasm    Endoscopic transphenoidal or transnasal resection of pituitary tumor    Pituitary adenoma    Endoscopic transphenoidal or transnasal resection of pituitary tumor    No significant past surgical history    Chronic GERD    Hyperlipemia    Diabetes    SysAdmin_VstLnk        ASSESSMENT:  34 y/o M with PMH DM, HLD, GERD, acromegaly, presenting with headaches. MRI with and without contrast performed on 12/12/23 showing a 1.5 x 1.2 x 1.5cm right sided pituitary macroadenoma. S/p transphenoidal resection of pituitary adenoma with small CSF leak repaired with nasoseptal flap (4/10).     Neuro:  - neuro/vital checks q8h   - pain control: Tylenol prn, oxy 5/10 prn   - skull base precautions   - no imaging needed: MRI outpt     ENT:  - BL valentin splints in place, will be removed outpatient   - CTX while in hospital   - saline nasal spray q6    Cardio:  - SBP   - HLD: lipitor 10mg     Pulm:  - RA     GI:  - CCD  - bowel regimen   - GERD: protonix (takes omeprazole at home)    Endo:  - A1c: 7.9  - TSH 0.25, FT4 1.570; f/u endo labs  - DM: ISS (low dose), lantus 15 qhs, lispro 3u  - Hydrocortisone taper 25 q12, f/u endo recs    Renal:   - IVL, voiding  - DI watch    Heme:  - SCDs for DVT prophylaxis     ID:  - CTX per ENT while in hospital  - Afebrile     Dispo: NSICU status, full code, PT/OT no needs     D/W Dr. Graves     Assessment:  Present when checked    []  GCS  E   V  M     Heart Failure: []Acute, [] acute on chronic , []chronic  Heart Failure:  [] Diastolic (HFpEF), [] Systolic (HFrEF), []Combined (HFpEF and HFrEF), [] RHF, [] Pulm HTN, [] Other    [] MARY, [] ATN, [] AIN, [] other  [] CKD1, [] CKD2, [] CKD 3, [] CKD 4, [] CKD 5, []ESRD    Encephalopathy: [] Metabolic, [] Hepatic, [] toxic, [] Neurological, [] Other    Abnormal Nurtitional Status: [] malnurtition (see nutrition note), [ ]underweight: BMI < 19, [] morbid obesity: BMI >40, [] Cachexia    [] Sepsis  [] hypovolemic shock,[] cardiogenic shock, [] hemorrhagic shock, [] neuogenic shock  [] Acute Respiratory Failure  []Cerebral edema, [] Brain compression/ herniation,   [] Functional quadriplegia  [] Acute blood loss anemia   HPI:    Mr. Joseph is a 33 year old male with history of diabetes, HLD presenting with headaches. MRI with and without contrast performed on 12/12/23 showing a 1.5 x 1.2 x 1.5cm right sided pituitary macroadenoma. Presents today with enlargement of his hands/feet and acromegalic features. Having pain that is waking him up. Denies vision changes. Patient is here now for endoscopic endonasal resection of pituitary mass. (10 Apr 2024 06:45)      Subjective:  Patient admits to mild head pain.    Romanian translation provided at bedside by family.     Hospital course:  4/10: POD 0 TSP with small CSF leak repaired with nasoseptal flap frozen: pituitary adenoma. Endo consulted. Started on insulin gtt in OR, continued post-op. Started D5/NS.  and 330 for 21:00 and 22:00, ordered BMP and specific gravity. Dc'd D5/NS.   4/11: POD 1 TSP with CSF leak repair. KENYETTA overnight, neuro stable. Na 139, spec grav 1.009,  at midnight. Insulin gtt dc'd. Off bedrest 4/11 12:00pm. SD status. ISS made low dose, lispro premeal decreased to 3u from 5u, HCT decreased to 50q12 from 50q8.  4/12: POD 2. KENYETTA overnight. Neuro exam stable. Hydrocortisone decreased to 25 BID per endocrinology.   4/13: POD3, KENYETTA overnight  Vital Signs Last 24 Hrs  T(C): 36.6 (12 Apr 2024 20:00), Max: 36.8 (12 Apr 2024 05:00)  T(F): 97.8 (12 Apr 2024 20:00), Max: 98.3 (12 Apr 2024 08:27)  HR: 82 (12 Apr 2024 20:00) (73 - 93)  BP: 123/80 (12 Apr 2024 20:00) (115/80 - 128/84)  BP(mean): --  RR: 16 (12 Apr 2024 20:00) (16 - 18)  SpO2: 95% (12 Apr 2024 20:00) (92% - 96%)    Parameters below as of 12 Apr 2024 20:00  Patient On (Oxygen Delivery Method): room air        I&O's Summary    11 Apr 2024 07:01  -  12 Apr 2024 07:00  --------------------------------------------------------  IN: 580 mL / OUT: 1300 mL / NET: -720 mL    12 Apr 2024 07:01  -  13 Apr 2024 00:12  --------------------------------------------------------  IN: 240 mL / OUT: 850 mL / NET: -610 mL        PHYSICAL EXAM:  General: patient seen laying supine in bed in NAD  Neuro: AAOx3, follows commands, opens eyes spontaneously, speech clear and fluent, CNII-XI grossly intact, face symmetric, no pronator drift, strength 5/5 b/l upper extremities and lower extremities, sensation intact to light touch throughout  HEENT: PERRL, EOMI  Neck: supple  Cardiac: RRR, S1S2  Pulmonary: chest rise symmetric  Abdomen: soft, nontender, nondistended  Ext: perfusing well  Skin: warm, dry              LABS:                        12.9   6.65  )-----------( 270      ( 12 Apr 2024 05:30 )             38.7     04-12    138  |  100  |  23  ----------------------------<  162<H>  3.9   |  26  |  1.03    Ca    9.2      12 Apr 2024 19:07  Phos  4.7     04-12  Mg     2.3     04-12        Urinalysis Basic - ( 12 Apr 2024 19:07 )    Color: x / Appearance: x / SG: x / pH: x  Gluc: 162 mg/dL / Ketone: x  / Bili: x / Urobili: x   Blood: x / Protein: x / Nitrite: x   Leuk Esterase: x / RBC: x / WBC x   Sq Epi: x / Non Sq Epi: x / Bacteria: x          CAPILLARY BLOOD GLUCOSE      POCT Blood Glucose.: 245 mg/dL (12 Apr 2024 22:03)  POCT Blood Glucose.: 85 mg/dL (12 Apr 2024 17:26)  POCT Blood Glucose.: 162 mg/dL (12 Apr 2024 12:22)  POCT Blood Glucose.: 139 mg/dL (12 Apr 2024 06:54)      Drug Levels: [] N/A    CSF Analysis: [] N/A      Allergies    No Known Allergies    Intolerances      MEDICATIONS:  Antibiotics:  cefTRIAXone   IVPB 2000 milliGRAM(s) IV Intermittent every 12 hours    Neuro:  acetaminophen     Tablet .. 1000 milliGRAM(s) Oral every 8 hours  oxyCODONE    IR 5 milliGRAM(s) Oral every 4 hours PRN  oxyCODONE    IR 10 milliGRAM(s) Oral every 4 hours PRN    Anticoagulation:    OTHER:  atorvastatin 10 milliGRAM(s) Oral at bedtime  benzocaine/menthol Lozenge 1 Lozenge Oral every 6 hours PRN  hydrocortisone 25 milliGRAM(s) Oral every 12 hours  insulin glargine Injectable (LANTUS) 15 Unit(s) SubCutaneous at bedtime  insulin lispro (ADMELOG) corrective regimen sliding scale   SubCutaneous Before meals and at bedtime  insulin lispro Injectable (ADMELOG) 3 Unit(s) SubCutaneous three times a day before meals  pantoprazole    Tablet 40 milliGRAM(s) Oral before breakfast  polyethylene glycol 3350 17 Gram(s) Oral daily  senna 2 Tablet(s) Oral at bedtime  sodium chloride 0.65% Nasal 1 Spray(s) Both Nostrils every 6 hours    IVF:    CULTURES:    RADIOLOGY & ADDITIONAL TESTS:    D35.2    Handoff    MEWS Score    Diabetes    GERD (gastroesophageal reflux disease)    H/O headache    HLD (hyperlipidemia)    Hayfever    Pituitary neoplasm    Pituitary neoplasm    Endoscopic transphenoidal or transnasal resection of pituitary tumor    Pituitary adenoma    Endoscopic transphenoidal or transnasal resection of pituitary tumor    No significant past surgical history    Chronic GERD    Hyperlipemia    Diabetes    SysAdmin_VstLnk        ASSESSMENT:  32 y/o M with PMH DM, HLD, GERD, acromegaly, presenting with headaches. MRI with and without contrast performed on 12/12/23 showing a 1.5 x 1.2 x 1.5cm right sided pituitary macroadenoma. S/p transphenoidal resection of pituitary adenoma with small CSF leak repaired with nasoseptal flap (4/10).     Neuro:  - neuro/vital checks q8h   - pain control: Tylenol prn, oxy 5/10 prn   - skull base precautions   - no imaging needed: MRI outpt     ENT:  - BL valentin splints in place, will be removed outpatient   - CTX while in hospital   - saline nasal spray q6    Cardio:  - SBP   - HLD: lipitor 10mg     Pulm:  - RA     GI:  - CCD  - bowel regimen   - GERD: protonix (takes omeprazole at home)    Endo:  - A1c: 7.9  - TSH 0.25, FT4 1.570; f/u endo labs  - DM: ISS (low dose), lantus 15 qhs, lispro 3u  - Hydrocortisone taper 25 q12, f/u endo recs    Renal:   - IVL, voiding  - DI watch    Heme:  - SCDs for DVT prophylaxis     ID:  - CTX per ENT while in hospital  - Afebrile     Dispo: NSICU status, full code, PT/OT no needs     D/W Dr. Graves     Assessment:  Present when checked    []  GCS  E   V  M     Heart Failure: []Acute, [] acute on chronic , []chronic  Heart Failure:  [] Diastolic (HFpEF), [] Systolic (HFrEF), []Combined (HFpEF and HFrEF), [] RHF, [] Pulm HTN, [] Other    [] MARY, [] ATN, [] AIN, [] other  [] CKD1, [] CKD2, [] CKD 3, [] CKD 4, [] CKD 5, []ESRD    Encephalopathy: [] Metabolic, [] Hepatic, [] toxic, [] Neurological, [] Other    Abnormal Nurtitional Status: [] malnurtition (see nutrition note), [ ]underweight: BMI < 19, [] morbid obesity: BMI >40, [] Cachexia    [] Sepsis  [] hypovolemic shock,[] cardiogenic shock, [] hemorrhagic shock, [] neuogenic shock  [] Acute Respiratory Failure  []Cerebral edema, [] Brain compression/ herniation,   [] Functional quadriplegia  [] Acute blood loss anemia

## 2024-04-13 NOTE — PROGRESS NOTE ADULT - REASON FOR ADMISSION
pituitary mass

## 2024-04-13 NOTE — PROGRESS NOTE ADULT - ASSESSMENT
32 yo M with PMH DM, HLD, GERD, acromegaly, presenting with headaches. MRI with and without contrast performed on 12/12/23 showing a 1.5 x 1.2 x 1.5cm right sided pituitary macroadenoma. S/p transphenoidal resection of pituitary adenoma (4/10).     #Pituitary macroadenoma s/p transphenoidal resection of pituitary adenoma  -Management as per Neurosurgery and ENT   -Continue pain and bowel regimen as per NSGY team   -Continue Hydrocortisone 25 mg q12   -Will continue CTX while inpatient   - Pt w/ valentin splints in place per ENT the pt to Follow up with Dr. Vega in clinic next Thurs 4/18 for removal of doyles   -Continue nasal spray     #Diabetes   -HgbA1c 7.9  - Endocrine following   - Will continue lantus 15 units at bedtime.   -Continue lispro 3 units TIDAC as well as low dose ISS TIDAC    #HLD  -Continue atorvastatin 10mg qhs     #GERD  -Continue PPI     #DVT prop  - SCDs for DVT prophylaxis  and ambulate adlib     #DISPO  - Pt is likely for d/c today 4/13 as per NSGY team     40 minutes spent on total encounter. The necessity of the time spent during the encounter on this date of service was due to:    Review of hospital course, labs, vitals, medical records.  Bedside exam and speaking to the pt with the assistance of  ID #[id number].  Discussed plan of care with neurosurgery service ACP  Documenting the encounter.

## 2024-04-13 NOTE — PROGRESS NOTE ADULT - ASSESSMENT
ZIA GIBBS is a 33M s/p TSPR for growth-hormone secreting pituitary adenoma, low flow CSF leak intraop. Closed with middle turbinate mucosal graft, adheris 4/10.    PLAN:  - valentin splints in place  - ceftriaxone while admitted  - nasal sprays   - discharge 4/13  - Follow up with Dr. Vega in clinic next Thurs for removal of doyles     Disposition:   [facility] at [phone] with any questions/concerns.

## 2024-04-13 NOTE — DISCHARGE NOTE NURSING/CASE MANAGEMENT/SOCIAL WORK - NSDCPEFALRISK_GEN_ALL_CORE
For information on Fall & Injury Prevention, visit: [email or web address] OR  [email or web address] OR  https://www.cdc.gov/steadi/patient.html

## 2024-04-13 NOTE — PROGRESS NOTE ADULT - PROVIDER SPECIALTY LIST ADULT
Neurosurgery
ENT
Hospitalist
ENT
Endocrinology
Endocrinology
NSICU
NSICU
Neurosurgery
ENT
NSICU

## 2024-04-13 NOTE — PROGRESS NOTE ADULT - SUBJECTIVE AND OBJECTIVE BOX
Patient was seen and examined at bedside. Case discuss with the neurosurgery team. Pt w/o any complaints this morning.     OBJECTIVE:  Vital Signs Last 24 Hrs  T(C): 36.6 (13 Apr 2024 08:03), Max: 36.8 (13 Apr 2024 05:01)  T(F): 97.9 (13 Apr 2024 08:03), Max: 98.3 (13 Apr 2024 05:01)  HR: 84 (13 Apr 2024 08:03) (73 - 84)  BP: 115/79 (13 Apr 2024 08:03) (115/79 - 128/84)  RR: 16 (13 Apr 2024 08:03) (16 - 18)  SpO2: 93% (13 Apr 2024 08:03) (93% - 97%)    Parameters below as of 13 Apr 2024 08:03  Patient On (Oxygen Delivery Method): room air      PHYSICAL EXAM:  NAD sitting up in bed; family member at bedside   HEENT: dried blood on nares   CTA b/l no wheezing or crackles  NL S1,S2 no mumurs   soft NT/ND + BS no rebound or guarding     LABS:                        13.1   6.70  )-----------( 268      ( 13 Apr 2024 05:30 )             40.2     04-13    141  |  103  |  19  ----------------------------<  109<H>  3.9   |  28  |  0.79    Ca    10.1      13 Apr 2024 05:30  Phos  5.6     04-13  Mg     2.3     04-13      CAPILLARY BLOOD GLUCOSE  POCT Blood Glucose.: 221 mg/dL (13 Apr 2024 11:46)  POCT Blood Glucose.: 92 mg/dL (13 Apr 2024 06:35)  POCT Blood Glucose.: 245 mg/dL (12 Apr 2024 22:03)  POCT Blood Glucose.: 85 mg/dL (12 Apr 2024 17:26)  POCT Blood Glucose.: 162 mg/dL (12 Apr 2024 12:22)      acetaminophen     Tablet .. 1000 milliGRAM(s) Oral every 8 hours  atorvastatin 10 milliGRAM(s) Oral at bedtime  benzocaine/menthol Lozenge 1 Lozenge Oral every 6 hours PRN  cefTRIAXone   IVPB 2000 milliGRAM(s) IV Intermittent every 12 hours  hydrocortisone 25 milliGRAM(s) Oral every 12 hours  insulin glargine Injectable (LANTUS) 15 Unit(s) SubCutaneous at bedtime  insulin lispro (ADMELOG) corrective regimen sliding scale   SubCutaneous Before meals and at bedtime  insulin lispro Injectable (ADMELOG) 3 Unit(s) SubCutaneous three times a day before meals  oxyCODONE    IR 10 milliGRAM(s) Oral every 4 hours PRN  oxyCODONE    IR 5 milliGRAM(s) Oral every 4 hours PRN  pantoprazole    Tablet 40 milliGRAM(s) Oral before breakfast  polyethylene glycol 3350 17 Gram(s) Oral daily  senna 2 Tablet(s) Oral at bedtime  sodium chloride 0.65% Nasal 1 Spray(s) Both Nostrils every 6 hours

## 2024-04-13 NOTE — DISCHARGE NOTE NURSING/CASE MANAGEMENT/SOCIAL WORK - NSDCFUADDAPPT_GEN_ALL_CORE_FT
Please call Dr. Graves's office to confirm your follow-up appointment, which will be in 2 weeks.   Please call Dr. Vega's office to confirm your appointment on Thursday (4/18).   Please schedule a follow-up appointment with endocrinology, Dr. Maldonado.

## 2024-04-14 LAB
INSULIN-LIKE GROWTH FACTOR 1 INTERPRETATION: SIGNIFICANT CHANGE UP
INSULIN-LIKE GROWTH FACTOR 1: 601 NG/ML — HIGH (ref 83–241)

## 2024-04-15 ENCOUNTER — NON-APPOINTMENT (OUTPATIENT)
Age: 34
End: 2024-04-15

## 2024-04-15 LAB — SURGICAL PATHOLOGY STUDY: SIGNIFICANT CHANGE UP

## 2024-04-16 ENCOUNTER — EMERGENCY (EMERGENCY)
Facility: HOSPITAL | Age: 34
LOS: 1 days | Discharge: HOME | End: 2024-04-16
Attending: EMERGENCY MEDICINE | Admitting: EMERGENCY MEDICINE
Payer: MEDICAID

## 2024-04-16 VITALS
RESPIRATION RATE: 16 BRPM | HEART RATE: 77 BPM | OXYGEN SATURATION: 97 % | WEIGHT: 169.98 LBS | TEMPERATURE: 98 F | HEIGHT: 63 IN | DIASTOLIC BLOOD PRESSURE: 87 MMHG | SYSTOLIC BLOOD PRESSURE: 120 MMHG

## 2024-04-16 DIAGNOSIS — Z86.011 PERSONAL HISTORY OF BENIGN NEOPLASM OF THE BRAIN: ICD-10-CM

## 2024-04-16 DIAGNOSIS — Z20.822 CONTACT WITH AND (SUSPECTED) EXPOSURE TO COVID-19: ICD-10-CM

## 2024-04-16 DIAGNOSIS — G44.209 TENSION-TYPE HEADACHE, UNSPECIFIED, NOT INTRACTABLE: ICD-10-CM

## 2024-04-16 DIAGNOSIS — E11.9 TYPE 2 DIABETES MELLITUS WITHOUT COMPLICATIONS: ICD-10-CM

## 2024-04-16 DIAGNOSIS — R11.0 NAUSEA: ICD-10-CM

## 2024-04-16 DIAGNOSIS — R42 DIZZINESS AND GIDDINESS: ICD-10-CM

## 2024-04-16 DIAGNOSIS — K21.9 GASTRO-ESOPHAGEAL REFLUX DISEASE WITHOUT ESOPHAGITIS: ICD-10-CM

## 2024-04-16 DIAGNOSIS — E22.0 ACROMEGALY AND PITUITARY GIGANTISM: ICD-10-CM

## 2024-04-16 DIAGNOSIS — E78.5 HYPERLIPIDEMIA, UNSPECIFIED: ICD-10-CM

## 2024-04-16 PROCEDURE — 99285 EMERGENCY DEPT VISIT HI MDM: CPT

## 2024-04-16 RX ORDER — METOCLOPRAMIDE HCL 10 MG
10 TABLET ORAL ONCE
Refills: 0 | Status: COMPLETED | OUTPATIENT
Start: 2024-04-16 | End: 2024-04-16

## 2024-04-16 RX ORDER — SODIUM CHLORIDE 9 MG/ML
1000 INJECTION INTRAMUSCULAR; INTRAVENOUS; SUBCUTANEOUS ONCE
Refills: 0 | Status: COMPLETED | OUTPATIENT
Start: 2024-04-16 | End: 2024-04-16

## 2024-04-16 RX ORDER — DIPHENHYDRAMINE HCL 50 MG
12.5 CAPSULE ORAL ONCE
Refills: 0 | Status: COMPLETED | OUTPATIENT
Start: 2024-04-16 | End: 2024-04-16

## 2024-04-16 NOTE — ED ADULT NURSE NOTE - PHONE #
[address] [place] [address] ED  Emergency Department Encounter  Emergency Medicine Resident     Pt Rogelio Connell  MRN: [id number]  [facility] 1951  Date of evaluation: 9/20/23  PCP:  Al Workman MD  Note Started: 5:51 AM EDT      CHIEF COMPLAINT       Chief Complaint   Patient presents with    Constipation       HISTORY OF PRESENT ILLNESS  (Location/Symptom, Timing/Onset, Context/Setting, Quality, Duration, Modifying Factors, Severity.)      Dez Mcrae is a 74 y.o. male who presents with rectal pain. Patient is very hard of hearing and does not provide a good history. He states \"my rectum is about to explode\"and tries to communicate that he has rectal pain. Patient is unable to answer if he is been taking any medication for pain management. PAST MEDICAL / SURGICAL / SOCIAL / FAMILY HISTORY      has a past medical history of Arthritis and COPD (chronic obstructive pulmonary disease) ([address] has a past surgical history that includes Lumbar disc surgery and Circumcision. Social History     Socioeconomic History    Marital status: Legally      Spouse name: Not on file    Number of children: Not on file    Years of education: Not on file    Highest education level: Not on file   Occupational History    Not on file   Tobacco Use    Smoking status: Former    Smokeless tobacco: Never   Substance and Sexual Activity    Alcohol use: No    Drug use: No    Sexual activity: Not on file   Other Topics Concern    Not on file   Social History Narrative    Not on file     Social Determinants of Health     Financial Resource Strain: Not on file   Food Insecurity: Not on file   Transportation Needs: Not on file   Physical Activity: Not on file   Stress: Not on file   Social Connections: Not on file   Intimate Partner Violence: Not on file   Housing Stability: Not on file       No family history on file.     Allergies:  Cyclobenzaprine    Home Medications:  Prior to Admission medications Medication Sig Start Date End Date Taking? Authorizing Provider   oxyCODONE (ROXICODONE) 5 MG immediate release tablet Take 1 tablet by mouth every 6 hours as needed for Pain for up to 3 days. Intended supply: 3 days.  Take lowest dose possible to manage pain Max Daily Amount: 20 mg 9/18/23 9/21/23  Sam Motta MD   dicyclomine (BENTYL) 10 MG capsule Take 1 capsule by mouth every 6 hours as needed (cramps) 2/23/22   Sterling Bravo MD   hydrocortisone (ANUSOL-HC) 25 MG suppository Place 1 suppository rectally 2 times daily 2/23/22   Sterling Bravo MD   simethicone (MYLICON) 715 MG chewable tablet Take 1 tablet by mouth every 6 hours as needed for Flatulence 2/23/22   Sterling Bravo MD   acetaminophen (APAP EXTRA STRENGTH) 500 MG tablet Take 2 tablets by mouth every 6 hours as needed for Pain 2/29/20   [place]DO sintia   docusate sodium (COLACE) 100 MG capsule Take 1 capsule by mouth 2 times daily 2/29/20   [place], DO   Cholecalciferol (VITAMIN D-400) 400 UNIT TABS tablet Take 1 tablet by mouth daily 6/22/17   Ross Flores MD   diphenhydrAMINE (BENADRYL) 25 MG capsule Take 1 capsule by mouth nightly as needed for Itching 6/22/17   Ross Flores MD   gabapentin (NEURONTIN) 300 MG capsule Take up top three times daily for nerve pain 6/22/17   Ross Flores MD   Multiple Vitamins-Minerals (THERAPEUTIC MULTIVITAMIN-MINERALS) tablet Take 1 tablet by mouth daily 6/22/17   Ross Flores MD   tamsulosin (FLOMAX) 0.4 MG capsule Take 1 capsule by mouth daily 6/22/17   Ross Flores MD   VENTOLIN  (90 Base) MCG/ACT inhaler Inhale 1 puff into the lungs every 6 hours as needed for Wheezing 6/22/17   Ross Flores MD   COMBIVENT RESPIMAT  MCG/ACT AERS inhaler Inhale 1 puff into the lungs every 4 hours 6/22/17   Ross Flores MD   naproxen (NAPROSYN) 500 MG tablet Take 1 tablet by mouth 2 times daily 6/22/17   Ross Flores MD   triamcinolone (KENALOG) 0.025 % LOTN [phone]

## 2024-04-16 NOTE — ED ADULT TRIAGE NOTE - CHIEF COMPLAINT QUOTE
with pituitary macroadenoma s/p transphenoidal resection of pituitary adenoma Apr 10th; with headaches, dizziness, nausea since discharged 4/13th ; denies fever or chills

## 2024-04-16 NOTE — ED ADULT NURSE NOTE - OBJECTIVE STATEMENT
Pt is a 32 yo M here for headache. Pt admitted and discharged from neurosurg 4/10-4/13 for transphenoidal resection of pituitary tumor. Since dc, pt c/o worsening headache unchanged with tylenol; last dose this morning. Denies visual changes, n/v, f/c. Instructed to present here by surgeon.  On exam, significant swelling to both orbitals, lips and temporal areas. Ambulatory with even and steady gait, respirations even and unlabored.

## 2024-04-16 NOTE — ED ADULT TRIAGE NOTE - HOW PATIENT ADDRESSED, PROFILE
1. \"Have you been to the ER, urgent care clinic since your last visit? Hospitalized since your last visit? \" No    2. \"Have you seen or consulted any other health care providers outside of the [address] [place] [address] since your last visit? \" No     3. For patients aged 39-70: Has the patient had a colonoscopy / FIT/ Cologuard? No      If the patient is female:    4. For patients aged 41-77: Has the patient had a mammogram within the past 2 years? Yes - no Care Gap present      5. For patients aged 21-65: Has the patient had a pap smear?  Yes - no Care Gap present    Health Maintenance Due   Topic Date Due    Pneumococcal 0-64 years (1 - PCV) Never done    Flu Vaccine (1) Never done Joseph

## 2024-04-17 VITALS
SYSTOLIC BLOOD PRESSURE: 131 MMHG | HEART RATE: 74 BPM | RESPIRATION RATE: 18 BRPM | OXYGEN SATURATION: 97 % | DIASTOLIC BLOOD PRESSURE: 76 MMHG | TEMPERATURE: 98 F

## 2024-04-17 LAB
ALBUMIN SERPL ELPH-MCNC: 4.8 G/DL — SIGNIFICANT CHANGE UP (ref 3.3–5)
ALP SERPL-CCNC: 89 U/L — SIGNIFICANT CHANGE UP (ref 40–120)
ALT FLD-CCNC: 12 U/L — SIGNIFICANT CHANGE UP (ref 10–45)
ANION GAP SERPL CALC-SCNC: 8 MMOL/L — SIGNIFICANT CHANGE UP (ref 5–17)
AST SERPL-CCNC: 11 U/L — SIGNIFICANT CHANGE UP (ref 10–40)
BILIRUB SERPL-MCNC: 0.3 MG/DL — SIGNIFICANT CHANGE UP (ref 0.2–1.2)
BUN SERPL-MCNC: 22 MG/DL — SIGNIFICANT CHANGE UP (ref 7–23)
CALCIUM SERPL-MCNC: 10.2 MG/DL — SIGNIFICANT CHANGE UP (ref 8.4–10.5)
CHLORIDE SERPL-SCNC: 97 MMOL/L — SIGNIFICANT CHANGE UP (ref 96–108)
CO2 SERPL-SCNC: 29 MMOL/L — SIGNIFICANT CHANGE UP (ref 22–31)
CREAT SERPL-MCNC: 0.79 MG/DL — SIGNIFICANT CHANGE UP (ref 0.5–1.3)
EGFR: 119 ML/MIN/1.73M2 — SIGNIFICANT CHANGE UP
GLUCOSE SERPL-MCNC: 138 MG/DL — HIGH (ref 70–99)
HCT VFR BLD CALC: 42.9 % — SIGNIFICANT CHANGE UP (ref 39–50)
HGB BLD-MCNC: 14.1 G/DL — SIGNIFICANT CHANGE UP (ref 13–17)
MCHC RBC-ENTMCNC: 31.3 PG — SIGNIFICANT CHANGE UP (ref 27–34)
MCHC RBC-ENTMCNC: 32.9 GM/DL — SIGNIFICANT CHANGE UP (ref 32–36)
MCV RBC AUTO: 95.3 FL — SIGNIFICANT CHANGE UP (ref 80–100)
NRBC # BLD: 0 /100 WBCS — SIGNIFICANT CHANGE UP (ref 0–0)
PLATELET # BLD AUTO: 353 K/UL — SIGNIFICANT CHANGE UP (ref 150–400)
POTASSIUM SERPL-MCNC: 4.3 MMOL/L — SIGNIFICANT CHANGE UP (ref 3.5–5.3)
POTASSIUM SERPL-SCNC: 4.3 MMOL/L — SIGNIFICANT CHANGE UP (ref 3.5–5.3)
PROT SERPL-MCNC: 8.1 G/DL — SIGNIFICANT CHANGE UP (ref 6–8.3)
RAPID RVP RESULT: SIGNIFICANT CHANGE UP
RBC # BLD: 4.5 M/UL — SIGNIFICANT CHANGE UP (ref 4.2–5.8)
RBC # FLD: 11.8 % — SIGNIFICANT CHANGE UP (ref 10.3–14.5)
SARS-COV-2 RNA SPEC QL NAA+PROBE: SIGNIFICANT CHANGE UP
SODIUM SERPL-SCNC: 134 MMOL/L — LOW (ref 135–145)
WBC # BLD: 7.78 K/UL — SIGNIFICANT CHANGE UP (ref 3.8–10.5)
WBC # FLD AUTO: 7.78 K/UL — SIGNIFICANT CHANGE UP (ref 3.8–10.5)

## 2024-04-17 PROCEDURE — 70450 CT HEAD/BRAIN W/O DYE: CPT | Mod: MC

## 2024-04-17 PROCEDURE — 99281 EMR DPT VST MAYX REQ PHY/QHP: CPT

## 2024-04-17 PROCEDURE — 96375 TX/PRO/DX INJ NEW DRUG ADDON: CPT

## 2024-04-17 PROCEDURE — 70488 CT MAXILLOFACIAL W/O & W/DYE: CPT | Mod: MC

## 2024-04-17 PROCEDURE — 85027 COMPLETE CBC AUTOMATED: CPT

## 2024-04-17 PROCEDURE — 70488 CT MAXILLOFACIAL W/O & W/DYE: CPT | Mod: 26,MC

## 2024-04-17 PROCEDURE — 70450 CT HEAD/BRAIN W/O DYE: CPT | Mod: 26,MC

## 2024-04-17 PROCEDURE — 0225U NFCT DS DNA&RNA 21 SARSCOV2: CPT

## 2024-04-17 PROCEDURE — 36415 COLL VENOUS BLD VENIPUNCTURE: CPT

## 2024-04-17 PROCEDURE — 80053 COMPREHEN METABOLIC PANEL: CPT

## 2024-04-17 PROCEDURE — 99284 EMERGENCY DEPT VISIT MOD MDM: CPT | Mod: 25

## 2024-04-17 PROCEDURE — 96374 THER/PROPH/DIAG INJ IV PUSH: CPT

## 2024-04-17 RX ORDER — DEXAMETHASONE 0.5 MG/5ML
6 ELIXIR ORAL ONCE
Refills: 0 | Status: COMPLETED | OUTPATIENT
Start: 2024-04-17 | End: 2024-04-17

## 2024-04-17 RX ORDER — OXYCODONE AND ACETAMINOPHEN 5; 325 MG/1; MG/1
1 TABLET ORAL
Qty: 6 | Refills: 0
Start: 2024-04-17 | End: 2024-04-19

## 2024-04-17 RX ORDER — HYDROMORPHONE HYDROCHLORIDE 2 MG/ML
0.5 INJECTION INTRAMUSCULAR; INTRAVENOUS; SUBCUTANEOUS ONCE
Refills: 0 | Status: DISCONTINUED | OUTPATIENT
Start: 2024-04-17 | End: 2024-04-17

## 2024-04-17 RX ORDER — KETOROLAC TROMETHAMINE 30 MG/ML
15 SYRINGE (ML) INJECTION ONCE
Refills: 0 | Status: DISCONTINUED | OUTPATIENT
Start: 2024-04-17 | End: 2024-04-17

## 2024-04-17 RX ADMIN — HYDROMORPHONE HYDROCHLORIDE 0.5 MILLIGRAM(S): 2 INJECTION INTRAMUSCULAR; INTRAVENOUS; SUBCUTANEOUS at 02:11

## 2024-04-17 RX ADMIN — Medication 10 MILLIGRAM(S): at 00:01

## 2024-04-17 RX ADMIN — Medication 15 MILLIGRAM(S): at 04:23

## 2024-04-17 RX ADMIN — SODIUM CHLORIDE 1000 MILLILITER(S): 9 INJECTION INTRAMUSCULAR; INTRAVENOUS; SUBCUTANEOUS at 00:01

## 2024-04-17 RX ADMIN — Medication 6 MILLIGRAM(S): at 04:59

## 2024-04-17 RX ADMIN — Medication 12.5 MILLIGRAM(S): at 00:00

## 2024-04-17 NOTE — ED PROVIDER NOTE - NSFOLLOWUPINSTRUCTIONS_ED_ALL_ED_FT
You have headache and postoperative pain should you experiencing any worsening dizziness  or headache uncontrolled at home or fever chills facial swelling redness seek emergent medical attention otherwise follow-up closely with Dr. Graves take pain medication as prescribed hydrate well.

## 2024-04-17 NOTE — ED PROVIDER NOTE - CLINICAL SUMMARY MEDICAL DECISION MAKING FREE TEXT BOX
33-year-old male with pain after surgery will plan for neurosurgical consultation CT head without CT max face with and without ASIC labs pain control IV fluids Reglan for dizziness/vertigo as well as small dose of Benadryl Dilaudid IV fluids reassessment patient is afebrile but will check for RVP  with gentle noninvasive swab as well as UA

## 2024-04-17 NOTE — ED PROVIDER NOTE - PHYSICAL EXAMINATION
VITAL SIGNS: I have reviewed nursing notes and confirm.  CONSTITUTIONAL: Well appearing, in no acute distress.   SKIN:  warm and dry, no acute rash.   HEAD:  normocephalic, atraumatic.  EYES: EOM intact; conjunctiva and sclera clear.  ENT: No nasal discharge; airway clear. Normal voice no stridor no facial swelling or glossal edema no facial plethora  NECK: Supple.  CARD: S1, S2, Regular rate and rhythm.   RESP:  Clear to auscultation b/l, no wheezes, rales or rhonchi.  ABD: Normal bowel sounds; soft; non-distended; non-tender; no guarding/ rebound.  EXT: Normal ROM. No peripheral edema. Pulses intact and equal b/l.  NEURO: Alert, oriented, grossly unremarkable  PSYCH: Cooperative, mood and affect appropriate.

## 2024-04-17 NOTE — ED PROVIDER NOTE - OBJECTIVE STATEMENT
33-year-old male status post transsphenoidal pituitary adenoma resection with Dr. Graves 4/10 here today with dizziness facial pain nausea denies associated fever chills cough chest pain shortness of breath abdominal pain.  No facial swelling or neck redness.

## 2024-04-17 NOTE — ED PROVIDER NOTE - CARE PROVIDER_API CALL
Bhargav Graves.  Neurosurgery  [address] [facility] [place] [address]  Phone: [phone]  Fax: [phone]  Follow Up Time:

## 2024-04-17 NOTE — CONSULT NOTE ADULT - SUBJECTIVE AND OBJECTIVE BOX
HISTORY OF PRESENT ILLNESS: 34 y/o M with PMH DM, HLD, GERD, acromegaly, right sided pituitary macroadenoma s/p transphenoidal resection of pituitary adenoma with small CSF leak repaired with nasoseptal flap (4/10/24), presents to ED with worsening generalized headache x 2 days. Patient reports headache is not positional. Reports headache is 10/10 on pain scale. B/l valentin splints still in place. Patient is on hydrocortisone 20/10. Denies any metallic/salty taste, leaking from nares, change in vision, vomiting, or increased urine output.     PAST MEDICAL & SURGICAL HISTORY:  Diabetes      GERD (gastroesophageal reflux disease)      H/O headache  migraines      HLD (hyperlipidemia)      Hayfever      No significant past surgical history        FAMILY HISTORY:      SOCIAL HISTORY:  Tobacco Use:  EtOH use:   Substance:    Allergies    No Known Allergies    Intolerances        REVIEW OF SYSTEMS      General:	no recent illnesses, no recent wt gain/loss    Skin/Breast:  intact  	  Ophthalmologic:  negative, glasses for ***  	  ENMT:	negative    Respiratory and Thorax: no coughing, wheezing, recent URI  	  Cardiovascular: no chest pain, MONTELONGO    Gastrointestinal:	soft, non tender    Genitourinary: no frequency, dysuria    Musculoskeletal:	negative    Neurological:	see HPI    Psychiatric:	negative    Hematology/Lymphatics:	negative    Endocrine:  	negative    Allergic/Immunologic:  Negative      MEDICATIONS:  Antibiotics:    Neuro:    Anticoagulation:    OTHER:    IVF:      Vital Signs Last 24 Hrs  T(C): 36.8 (16 Apr 2024 22:27), Max: 36.8 (16 Apr 2024 22:27)  T(F): 98.2 (16 Apr 2024 22:27), Max: 98.2 (16 Apr 2024 22:27)  HR: 71 (16 Apr 2024 22:27) (71 - 77)  BP: 132/91 (16 Apr 2024 22:27) (120/87 - 132/91)  BP(mean): --  RR: 18 (16 Apr 2024 22:27) (16 - 18)  SpO2: 97% (16 Apr 2024 22:27) (97% - 97%)    Parameters below as of 16 Apr 2024 22:27  Patient On (Oxygen Delivery Method): room air          PHYSICAL EXAM:  Constitutional: awake, alert, sitting up in bed. NAD.   Respiratory: non-labored breathing. Normal chest rise.   Cardiovascular: Regular rate and rhythm  Gastrointestinal:  Soft, nontender, nondistended.  .  Vascular: Extremities warm, no ulcers, no discoloration of skin.   Neurological: Gen: AA&O x 3, conversant, appropriate.      CN II-XII grossly intact.    Motor: WELCH x 4, 5/5 throughout UE/LE.    Sens: Sensation intact to light touch throughout.    Extremities: warm and well perfused     No pronator drift  Wound/incision: b/l valentin splints in place     LABS:                        14.1   7.78  )-----------( 353      ( 16 Apr 2024 23:49 )             42.9               CULTURES:      RADIOLOGY & ADDITIONAL STUDIES:    Assessment: 34 y/o M with PMH DM, HLD, GERD, acromegaly, right sided pituitary macroadenoma s/p transphenoidal resection of pituitary adenoma with small CSF leak repaired with nasoseptal flap (4/10/24), presents to ED with worsening generalized headache x 2 days. CTH shows       Plan:     HISTORY OF PRESENT ILLNESS: 32 y/o M with PMH DM, HLD, GERD, acromegaly, right sided pituitary macroadenoma s/p transphenoidal resection of pituitary adenoma with small CSF leak repaired with nasoseptal flap (4/10/24), presents to ED with worsening generalized headache x 2 days. Patient reports headache is not positional. Reports headache is 10/10 on pain scale. B/l valentin splints still in place. Patient is on hydrocortisone 20/10. Denies any metallic/salty taste, leaking from nares, change in vision, vomiting, or increased urine output.     PAST MEDICAL & SURGICAL HISTORY:  Diabetes      GERD (gastroesophageal reflux disease)      H/O headache  migraines      HLD (hyperlipidemia)      Hayfever      No significant past surgical history        FAMILY HISTORY:      SOCIAL HISTORY:  Tobacco Use:  EtOH use:   Substance:    Allergies    No Known Allergies    Intolerances        REVIEW OF SYSTEMS      General:	no recent illnesses, no recent wt gain/loss    Skin/Breast:  intact  	  Ophthalmologic:  negative, glasses for ***  	  ENMT:	negative    Respiratory and Thorax: no coughing, wheezing, recent URI  	  Cardiovascular: no chest pain, MONTELONGO    Gastrointestinal:	soft, non tender    Genitourinary: no frequency, dysuria    Musculoskeletal:	negative    Neurological:	see HPI    Psychiatric:	negative    Hematology/Lymphatics:	negative    Endocrine:  	negative    Allergic/Immunologic:  Negative      MEDICATIONS:  Antibiotics:    Neuro:    Anticoagulation:    OTHER:    IVF:      Vital Signs Last 24 Hrs  T(C): 36.8 (16 Apr 2024 22:27), Max: 36.8 (16 Apr 2024 22:27)  T(F): 98.2 (16 Apr 2024 22:27), Max: 98.2 (16 Apr 2024 22:27)  HR: 71 (16 Apr 2024 22:27) (71 - 77)  BP: 132/91 (16 Apr 2024 22:27) (120/87 - 132/91)  BP(mean): --  RR: 18 (16 Apr 2024 22:27) (16 - 18)  SpO2: 97% (16 Apr 2024 22:27) (97% - 97%)    Parameters below as of 16 Apr 2024 22:27  Patient On (Oxygen Delivery Method): room air          PHYSICAL EXAM:  Constitutional: awake, alert, sitting up in bed. NAD.   Respiratory: non-labored breathing. Normal chest rise.   Cardiovascular: Regular rate and rhythm  Gastrointestinal:  Soft, nontender, nondistended.  .  Vascular: Extremities warm, no ulcers, no discoloration of skin.   Neurological: Gen: AA&O x 3, conversant, appropriate.      CN II-XII grossly intact.    Motor: WELCH x 4, 5/5 throughout UE/LE.    Sens: Sensation intact to light touch throughout.    Extremities: warm and well perfused     No pronator drift  Wound/incision: b/l valentin splints in place     LABS:                        14.1   7.78  )-----------( 353      ( 16 Apr 2024 23:49 )             42.9               CULTURES:      RADIOLOGY & ADDITIONAL STUDIES:    < from: CT Head No Cont (04.17.24 @ 01:50) >  IMPRESSION:  No acute intracranial hemorrhage, mass effect, or demarcated territorial   infarction.    < end of copied text >  < from: CT Maxillofacial w/wo IV Cont (04.17.24 @ 01:50) >  IMPRESSION:  Status post transsphenoidal surgery. Postoperative changes of fluid and   air within the ethmoid and sphenoid sinuses. No rim-enhancing fluid   collections.    < end of copied text >    Assessment: 32 y/o M with PMH DM, HLD, GERD, acromegaly, right sided pituitary macroadenoma s/p transphenoidal resection of pituitary adenoma with small CSF leak repaired with nasoseptal flap (4/10/24), presents to ED with worsening generalized headache x 2 days. CTH shows no acute ICH, mass effect or demarcated territorial region.           Plan:  Case and imaging seen and discussed with Dr. Graves  No neurosurgical intervention indicated at this time  Recommend pain control   Please have patent follow up with Dr. Graves on Monday 4/22 for outpatient follow up. The office will call patient with a time.    Discussed w/ Dr. Graves    HISTORY OF PRESENT ILLNESS: 32 y/o M with PMH DM, HLD, GERD, acromegaly, right sided pituitary macroadenoma s/p transphenoidal resection of pituitary adenoma with small CSF leak repaired with nasoseptal flap (4/10/24), presents to ED with worsening generalized headache x 2 days. Patient reports headache is not positional. Reports headache is 10/10 on pain scale. B/l valentin splints still in place. Patient is on hydrocortisone 20/10. Denies any metallic/salty taste, leaking from nares, change in vision, vomiting, or increased urine output.     PAST MEDICAL & SURGICAL HISTORY:  Diabetes      GERD (gastroesophageal reflux disease)      H/O headache  migraines      HLD (hyperlipidemia)      Hayfever      No significant past surgical history        FAMILY HISTORY:      SOCIAL HISTORY:  Tobacco Use:  EtOH use:   Substance:    Allergies    No Known Allergies    Intolerances        REVIEW OF SYSTEMS      General:	no recent illnesses, no recent wt gain/loss    Skin/Breast:  intact  	  Ophthalmologic:  negative, glasses for ***  	  ENMT:	negative    Respiratory and Thorax: no coughing, wheezing, recent URI  	  Cardiovascular: no chest pain, MONTELONGO    Gastrointestinal:	soft, non tender    Genitourinary: no frequency, dysuria    Musculoskeletal:	negative    Neurological:	see HPI    Psychiatric:	negative    Hematology/Lymphatics:	negative    Endocrine:  	negative    Allergic/Immunologic:  Negative      MEDICATIONS:  Antibiotics:    Neuro:    Anticoagulation:    OTHER:    IVF:      Vital Signs Last 24 Hrs  T(C): 36.8 (16 Apr 2024 22:27), Max: 36.8 (16 Apr 2024 22:27)  T(F): 98.2 (16 Apr 2024 22:27), Max: 98.2 (16 Apr 2024 22:27)  HR: 71 (16 Apr 2024 22:27) (71 - 77)  BP: 132/91 (16 Apr 2024 22:27) (120/87 - 132/91)  BP(mean): --  RR: 18 (16 Apr 2024 22:27) (16 - 18)  SpO2: 97% (16 Apr 2024 22:27) (97% - 97%)    Parameters below as of 16 Apr 2024 22:27  Patient On (Oxygen Delivery Method): room air          PHYSICAL EXAM:  Constitutional: awake, alert, sitting up in bed. NAD.   Respiratory: non-labored breathing. Normal chest rise.   Cardiovascular: Regular rate and rhythm  Gastrointestinal:  Soft, nontender, nondistended.  .  Vascular: Extremities warm, no ulcers, no discoloration of skin.   Neurological: Gen: AA&O x 3, conversant, appropriate.      CN II-XII grossly intact.    Motor: WELCH x 4, 5/5 throughout UE/LE.    Sens: Sensation intact to light touch throughout.    Extremities: warm and well perfused     No pronator drift  Wound/incision: b/l valentin splints in place     LABS:                        14.1   7.78  )-----------( 353      ( 16 Apr 2024 23:49 )             42.9               CULTURES:      RADIOLOGY & ADDITIONAL STUDIES:    < from: CT Head No Cont (04.17.24 @ 01:50) >  IMPRESSION:  No acute intracranial hemorrhage, mass effect, or demarcated territorial   infarction.    < end of copied text >      Assessment: 32 y/o M with PMH DM, HLD, GERD, acromegaly, right sided pituitary macroadenoma s/p transphenoidal resection of pituitary adenoma with small CSF leak repaired with nasoseptal flap (4/10/24), presents to ED with worsening generalized headache x 2 days. CTH shows no acute ICH, mass effect or demarcated territorial region.           Plan:  Case and imaging seen and discussed with Dr. Graves  No neurosurgical intervention indicated at this time  Recommend pain control   Please have patent follow up with Dr. Graves on Monday 4/22 for outpatient follow up. The office will call patient with a time.    Discussed w/ Dr. Graves

## 2024-04-17 NOTE — ED PROVIDER NOTE - PROGRESS NOTE DETAILS
Imaging negative patient states he feels improved with postoperative pain steady gait stable for DC with Dr. Graves will DC with 2 days of Percocet return precautions Imaging negative patient states he feels improved with postoperative pain steady gait stable for DC with Dr. [place]/vaelina, seen by NSx who recommending outpt f/u, will DC with 2 days of Percocet and return precautions

## 2024-04-17 NOTE — ED PROVIDER NOTE - PATIENT PORTAL LINK FT
You can access the FollowMyHealth Patient Portal offered by [facility] by registering at the following website: [email or web address]. By joining [facility]’s FollowMyHealth portal, you will also be able to view your health information using other applications (apps) compatible with our system.

## 2024-04-18 ENCOUNTER — APPOINTMENT (OUTPATIENT)
Dept: OTOLARYNGOLOGY | Facility: CLINIC | Age: 34
End: 2024-04-18
Payer: MEDICAID

## 2024-04-18 PROCEDURE — 31237 NSL/SINS NDSC SURG BX POLYPC: CPT | Mod: 58

## 2024-04-18 PROCEDURE — 99024 POSTOP FOLLOW-UP VISIT: CPT

## 2024-04-18 RX ORDER — SODIUM CHLORIDE 0.65 %
0.65 AEROSOL, SPRAY (ML) NASAL
Qty: 2 | Refills: 5 | Status: ACTIVE | COMMUNITY
Start: 2024-04-18 | End: 1900-01-01

## 2024-04-18 NOTE — DATA REVIEWED
[de-identified] : MRI Sella 12/12/23: FINDINGS:  There is a hypoenhancing lesion within the right aspect of the pituitary gland measuring 1.5 x 1.2 x 1.5 cm (series 8 image 7, series 7 image 7), consistent with pituitary macroadenoma. There is leftward deviation of the pituitary infundibulum. The lesion encroaches upon the right cavernous sinus. There is suprasellar extension without compression of the optic chiasm.  Volumetric FLAIR images of the brain demonstrate a few scattered punctate foci of T2 prolongation in the supratentorial white matter, nonspecific and can be seen in the setting of migraine headaches, chronic microvascular ischemic or inflammatory demyelination. There is nasal turbinate hypertrophy and mild scattered ethmoid air cell mucosal thickening.  IMPRESSION:    Right-sided pituitary macroadenoma measuring 1.5 x 1.2 x 1.5 cm exhibits encroachment of the right cavernous sinus and suprasellar extension, without compression of the optic chiasm.

## 2024-04-18 NOTE — CONSULT LETTER
[Dear  ___] : Dear  [unfilled], [Courtesy Letter:] : I had the pleasure of seeing your patient, [unfilled], in my office today. [Consult Closing:] : Thank you very much for allowing me to participate in the care of this patient.  If you have any questions, please do not hesitate to contact me. [Sincerely,] : Sincerely, [FreeTextEntry3] : Darci Vega MD  Department of Otolaryngology, Head and Neck Surgery [DrMasha  ___] : Dr. LYON [DrMasha ___] : Dr. LYON

## 2024-04-18 NOTE — PROCEDURE
[FreeTextEntry3] : doyles removed  Nasal Endoscopy: coagulum and debris suctioned nasal airways patent sphenoid w absorbables left in place, transmitted pulsations no active drainage

## 2024-04-18 NOTE — HISTORY OF PRESENT ILLNESS
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 Intraoperatively, low flow sellar CSF leak, repaired w free mucosal graft.  He is congested from surgery. He has headache but improved; he went to ED for this earlier this week but imaging was normal (I reviewed). There are no fevers and no clear nasal drainage.  Pathology: 1. Sphenoid mucosa: -Sinonasal mucosa with mild chronic inflammation. 2. Pituitary tumor: - Pituitary neuroendocrine tumor. 3. Pituitary tumor: - Pituitary neuroendocrine tumor, see note. Note:  Immunohistochemical stains performed on block 3A shows that the tumor cells are  positive for  Cam 5.2, Growth hormone, FSH, Prolactin (rare cells) and negative for LH, ACTH, TSH, CLIFTON 3. Stain for p53 shows positive staining in 1% of tumor cells. Ki-67 shows a proliferation index of 1-2%. Immunostains for transcription factors are pending.  ROS otherwise unremarkable.

## 2024-04-18 NOTE — ASSESSMENT
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 Intraoperatively, low flow sellar CSF leak, repaired w free mucosal graft. He is congested from surgery. He has headaches but improved; he actually went to ED for this earlier this week but imaging was normal (I reviewed). There are no fevers and no clear nasal drainage. On exam, nasal endoscopy shows expected postoperative changes. He is doing well. For now, maintain nasal precautions. Saline t/o day. RTO 3 weeks.

## 2024-04-22 ENCOUNTER — APPOINTMENT (OUTPATIENT)
Dept: NEUROSURGERY | Facility: CLINIC | Age: 34
End: 2024-04-22
Payer: MEDICAID

## 2024-04-22 VITALS
OXYGEN SATURATION: 98 % | SYSTOLIC BLOOD PRESSURE: 127 MMHG | HEIGHT: 63 IN | DIASTOLIC BLOOD PRESSURE: 74 MMHG | TEMPERATURE: 97.4 F | BODY MASS INDEX: 26.58 KG/M2 | WEIGHT: 150 LBS | RESPIRATION RATE: 18 BRPM | HEART RATE: 76 BPM

## 2024-04-22 DIAGNOSIS — R51.9 HEADACHE, UNSPECIFIED: ICD-10-CM

## 2024-04-22 PROCEDURE — 99024 POSTOP FOLLOW-UP VISIT: CPT

## 2024-04-22 RX ORDER — GABAPENTIN 100 MG/1
100 CAPSULE ORAL 3 TIMES DAILY
Qty: 90 | Refills: 0 | Status: ACTIVE | COMMUNITY
Start: 2024-04-22 | End: 1900-01-01

## 2024-04-22 NOTE — REASON FOR VISIT
[Spouse] : spouse [[employer] ] : provided by [employer]   [de-identified] : endoscopic transsphenoidal approach for resection of pituitary tumor [de-identified] : 4/10/2024 [de-identified] : path- pituitary neuroendocrine tumor [TWNoteComboBox1] : Citizen of [place]

## 2024-04-22 NOTE — PHYSICAL EXAM
[General Appearance - Alert] : alert [General Appearance - In No Acute Distress] : in no acute distress [General Appearance - Well Nourished] : well nourished [General Appearance - Well-Appearing] : healthy appearing [] : normal voice and communication [Oriented To Time, Place, And Person] : oriented to person, place, and time [Impaired Insight] : insight and judgment were intact [Affect] : the affect was normal [Memory Recent] : recent memory was not impaired [Abnormal Walk] : normal gait [Balance] : balance was intact

## 2024-04-22 NOTE — ASSESSMENT
[FreeTextEntry1] : Doing well. Growth hormone level now normal post-op, IGF1 level significantly less than preop (1314--->880--->601, expect continued decrease over 1 year). PLAN - repeat MRI head w/wo in one month - f/u endocrinology asap given h/o GH secreting macroadenoma, diabetes - gabapentin 100mg TID for headache - f/u in one month with new MRI to review

## 2024-04-22 NOTE — HISTORY OF PRESENT ILLNESS
[de-identified] : Mr. Joseph is a 33 year old male with history of diabetes, HLD presenting with headaches. MRI with and without contrast performed on 12/12/23 showing a 1.5 x 1.2 x 1.5cm right sided pituitary macroadenoma. Presents today with enlargement of his hands/feet and acromegalic features. Having pain that is waking him up. Denies vision changes.   Patient underwent elective TSRP  with CSF leak repair. post op hospital stay was uneventful and he was discharged to home on 4/13/2024       [FreeTextEntry1] : 4/22/2024 Today he states doing well and headache has been slowly improving but reports generalized weakness/tiredness and denies nasal discharge, dizziness, visual changes, n/v, fever/chills, BB dysfunction.  Growth Hormone 12.8 preop, now 1.86 post op IGF1 1314 preop, 601 post op

## 2024-04-26 ENCOUNTER — APPOINTMENT (OUTPATIENT)
Dept: NEUROSURGERY | Facility: CLINIC | Age: 34
End: 2024-04-26

## 2024-05-02 ENCOUNTER — APPOINTMENT (OUTPATIENT)
Dept: MRI IMAGING | Facility: CLINIC | Age: 34
End: 2024-05-02
Payer: MEDICAID

## 2024-05-02 PROCEDURE — 70553 MRI BRAIN STEM W/O & W/DYE: CPT

## 2024-05-02 PROCEDURE — A9585: CPT

## 2024-05-03 ENCOUNTER — APPOINTMENT (OUTPATIENT)
Dept: ENDOCRINOLOGY | Facility: CLINIC | Age: 34
End: 2024-05-03

## 2024-05-14 ENCOUNTER — APPOINTMENT (OUTPATIENT)
Dept: OTOLARYNGOLOGY | Facility: CLINIC | Age: 34
End: 2024-05-14
Payer: MEDICAID

## 2024-05-14 ENCOUNTER — NON-APPOINTMENT (OUTPATIENT)
Age: 34
End: 2024-05-14

## 2024-05-14 PROCEDURE — 99024 POSTOP FOLLOW-UP VISIT: CPT

## 2024-05-14 NOTE — DATA REVIEWED
[de-identified] : MRI Sella 12/12/23: FINDINGS:  There is a hypoenhancing lesion within the right aspect of the pituitary gland measuring 1.5 x 1.2 x 1.5 cm (series 8 image 7, series 7 image 7), consistent with pituitary macroadenoma. There is leftward deviation of the pituitary infundibulum. The lesion encroaches upon the right cavernous sinus. There is suprasellar extension without compression of the optic chiasm.  Volumetric FLAIR images of the brain demonstrate a few scattered punctate foci of T2 prolongation in the supratentorial white matter, nonspecific and can be seen in the setting of migraine headaches, chronic microvascular ischemic or inflammatory demyelination. There is nasal turbinate hypertrophy and mild scattered ethmoid air cell mucosal thickening.  IMPRESSION:    Right-sided pituitary macroadenoma measuring 1.5 x 1.2 x 1.5 cm exhibits encroachment of the right cavernous sinus and suprasellar extension, without compression of the optic chiasm.

## 2024-05-14 NOTE — PROCEDURE
[FreeTextEntry3] : Nasal Endoscopy: mild debris suctioned nasal airways patent sphenoid w crust and mild absorbables left in place, transmitted pulsations no active drainage

## 2024-05-14 NOTE — HISTORY OF PRESENT ILLNESS
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 Intraoperatively, low flow sellar CSF leak, repaired w free mucosal graft.  He is doing much better since last visit. There is no congestion or headache. There are no fevers and no clear nasal drainage.  Pathology: 1. Sphenoid mucosa: -Sinonasal mucosa with mild chronic inflammation. 2. Pituitary tumor: - Pituitary neuroendocrine tumor. 3. Pituitary tumor: - Pituitary neuroendocrine tumor, see note. Note:  Immunohistochemical stains performed on block 3A shows that the tumor cells are  positive for  Cam 5.2, Growth hormone, FSH, Prolactin (rare cells) and negative for LH, ACTH, TSH, CLIFTON 3. Stain for p53 shows positive staining in 1% of tumor cells. Ki-67 shows a proliferation index of 1-2%. Immunostains for transcription factors are pending.  ROS otherwise unremarkable.

## 2024-05-14 NOTE — ASSESSMENT
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 Intraoperatively, low flow sellar CSF leak, repaired w free mucosal graft. He is doing much better since last visit. There is no congestion or headache. There are no fevers and no clear nasal drainage. On exam, nasal endoscopy shows expected postoperative changes. He is doing well. For now, to start saline rinses BID. Advance activity. RTO 4 weeks.

## 2024-05-16 RX ORDER — HYDROCORTISONE 10 MG/1
10 TABLET ORAL
Qty: 90 | Refills: 2 | Status: ACTIVE | COMMUNITY
Start: 2024-05-16 | End: 1900-01-01

## 2024-05-20 ENCOUNTER — LABORATORY RESULT (OUTPATIENT)
Age: 34
End: 2024-05-20

## 2024-05-20 ENCOUNTER — APPOINTMENT (OUTPATIENT)
Dept: NEUROSURGERY | Facility: CLINIC | Age: 34
End: 2024-05-20
Payer: MEDICAID

## 2024-05-20 VITALS
WEIGHT: 150 LBS | HEIGHT: 63 IN | TEMPERATURE: 98 F | HEART RATE: 75 BPM | OXYGEN SATURATION: 98 % | DIASTOLIC BLOOD PRESSURE: 76 MMHG | SYSTOLIC BLOOD PRESSURE: 115 MMHG | RESPIRATION RATE: 18 BRPM | BODY MASS INDEX: 26.58 KG/M2

## 2024-05-20 LAB
DHEA-S SERPL-MCNC: 206 UG/DL
FSH SERPL-MCNC: 6.5 IU/L
GH SERPL-MCNC: 0.91 NG/ML
LH SERPL-ACNC: 2.5 IU/L
PROLACTIN SERPL-MCNC: 6.7 NG/ML
SHBG SERPL-SCNC: 12.9 NMOL/L
T3RU NFR SERPL: 1.1 TBI
T4 SERPL-MCNC: 7.9 UG/DL
TESTOST SERPL-MCNC: 177 NG/DL
TSH SERPL-ACNC: 0.42 UIU/ML

## 2024-05-20 PROCEDURE — 99024 POSTOP FOLLOW-UP VISIT: CPT

## 2024-05-20 NOTE — ASSESSMENT
[FreeTextEntry1] : MRI showed stable resection cavity without growth.  PLAN - oral facial surgeon for teeth evaluation 2/2 acromegaly - blood test (IGF-1, GH) today to reevalute hormone level - repeat MRI brain w/wo in 3 months - reassured his next endocrinology appointment in July 15, will retry for sooner appointment, continue to take hydrocortisone as prescribed until see endo (we will manage until then) - f/u after MRI to review

## 2024-05-20 NOTE — HISTORY OF PRESENT ILLNESS
[de-identified] : Mr. Joseph is a 33 year old male with history of diabetes, HLD presenting with headaches. MRI with and without contrast performed on 12/12/23 showing a 1.5 x 1.2 x 1.5cm right sided pituitary macroadenoma. Presents today with enlargement of his hands/feet and acromegalic features. Having pain that is waking him up. Denies vision changes.   Patient underwent elective TSRP  with CSF leak repair. post op hospital stay was uneventful and he was discharged to home on 4/13/2024 4/22/2024 Today he states doing well and headache has been slowly improving but reports generalized weakness/tiredness and denies nasal discharge, dizziness, visual changes, n/v, fever/chills, BB dysfunction.  Growth Hormone 12.8 preop, now 1.86 post op IGF1 1314 preop, 601 post op  plan was made to see endocrinology asap for GH  macroadenoma management. Also repeat MRI brain w/wo in one month and follow up after.    [FreeTextEntry1] : Today he returns and denies any new/worsening focal neuro deficits.

## 2024-05-20 NOTE — PHYSICAL EXAM
[General Appearance - Alert] : alert [General Appearance - In No Acute Distress] : in no acute distress [General Appearance - Well Nourished] : well nourished [General Appearance - Well-Appearing] : healthy appearing [Oriented To Time, Place, And Person] : oriented to person, place, and time [Impaired Insight] : insight and judgment were intact [Affect] : the affect was normal [Memory Recent] : recent memory was not impaired [Abnormal Walk] : normal gait

## 2024-05-20 NOTE — DATA REVIEWED
[de-identified] : brain w/wo on 5/2/2024 in PACS EXAM: [id number] - MR BRAIN WAW IC  - ORDERED BY: REGGIE DASILVA   PROCEDURE DATE:  05/02/2024    INTERPRETATION:  STUDY: MR BRAIN WITHOUT/WITH CONTRAST.  CLINICAL INDICATION: postop evaluation for pituitary macroadenoma;.  TECHNIQUE: Multiplanar, multisequence MR imaging of the brain was performed.  CONTRAST: 8 mL of Gadavist was administered. 2 mL was discarded.  COMPARISON: MR brain 4/10/2024  FINDINGS: There has been interval endoscopic transsphenoidal approach resection of the previously seen sellar lesion. A heterogeneous fluid-filled resection cavity is present within the right paramedian sella surrounded by the pituitary gland. Residual macroadenoma is not entirely excluded. There is persistent leftward deviation of the infundibular stalk. No mass effect on the optic apparatus or tumor involvement of the cavernous sinuses.  The ventricles and sulci are age appropriate . There are mild patchy areas of T2  hyperintensity within the periventricular and subcortical white matter which are non specific and may be related to chronic microvascular ischemic changes.There is no evidence of acute infarction. There is no extra axial collection. No midline shift or other significant mass effect is noted.  There is normal flow-void within major cranial vessels suggestive of their patency.  The orbital contents are grossly unremarkable. Near-complete opacification of the ethmoid air cells. Mild mucosal thickening of the maxillary sinuses. Postoperative changes within the sphenoid sinus surgical bed. The mastoid air cells are predominantly clear.  IMPRESSION: Postoperative changes related to endoscopic transsphenoidal approach resection of the previously seen pituitary lesion. Residual disease is not entirely excluded on this examination. No mass effect on the optic apparatus.  --- End of Report ---       ASHUTOSH VINES MD; Attending Radiologist This document has been electronically signed. May  9 2024  1:08PM

## 2024-05-20 NOTE — REASON FOR VISIT
[de-identified] : endoscopic transsphenoidal approach for resection of pituitary tumor [de-identified] : 4/10/2024 [de-identified] : path- pituitary neuroendocrine tumor To review one month post op MRI brain w/wo (in PACS-small residual tumor) [Spouse] : spouse [[employer] ] : provided by [employer]   [Interpreters_IDNumber] : [id number] [TWNoteComboBox1] : [place]

## 2024-05-22 LAB
IGF-1 INTERP: NORMAL
IGF-I BLD-MCNC: 458 NG/ML

## 2024-05-24 ENCOUNTER — APPOINTMENT (OUTPATIENT)
Dept: ENDOCRINOLOGY | Facility: CLINIC | Age: 34
End: 2024-05-24
Payer: MEDICAID

## 2024-05-24 VITALS
BODY MASS INDEX: 29.41 KG/M2 | SYSTOLIC BLOOD PRESSURE: 124 MMHG | HEART RATE: 75 BPM | WEIGHT: 166 LBS | DIASTOLIC BLOOD PRESSURE: 75 MMHG

## 2024-05-24 DIAGNOSIS — Z82.49 FAMILY HISTORY OF ISCHEMIC HEART DISEASE AND OTHER DISEASES OF THE CIRCULATORY SYSTEM: ICD-10-CM

## 2024-05-24 PROCEDURE — 99205 OFFICE O/P NEW HI 60 MIN: CPT

## 2024-05-24 PROCEDURE — G2211 COMPLEX E/M VISIT ADD ON: CPT | Mod: NC

## 2024-05-24 NOTE — REVIEW OF SYSTEMS
[Negative] : Endocrine [Fatigue] : fatigue [As Noted in HPI] : as noted in HPI [Swelling] : swelling

## 2024-05-27 NOTE — ADDENDUM
[FreeTextEntry1] : MATEO, Andreina Lamar, am scribing for an in the presence of  Dr. Eulalio Bain on this date in the following sections HISTORY OF PRESENT ILLNESS, PAST MEDICAL/FAMILY/SOCIAL HISTORY; REVIEW OF SYSTEMS; VITAL SIGNS; PHYSICAL EXAM; ASSESSMENT/PLAN.

## 2024-05-27 NOTE — CONSULT LETTER
[Dear  ___] : Dear  [unfilled], [Consult Letter:] : I had the pleasure of evaluating your patient, [unfilled]. [Sincerely,] : Sincerely, [FreeTextEntry3] : Eulalio Bain

## 2024-05-27 NOTE — REASON FOR VISIT
[Initial Evaluation] : an initial evaluation [Other___] : [unfilled] [Spouse] : spouse [FreeTextEntry2] : Dr. Fabiola Truong

## 2024-05-27 NOTE — HISTORY OF PRESENT ILLNESS
[FreeTextEntry1] : 34 year old M pt, with Hx of Acromegaly, referred by Fabiola Truong, presents today to establish endocrine care.  Other PMHx: DM dx 2020, HLD, Arthritis FHx: Mother: HTN No FHx of: Thyroid Disorder, MEN, or pituitary disorders. SHx: Non smoker, No etOH use. COVID Infection: 2019. COVID vaccinated. Appt with Dentist on 07/24 NKDA PCP: Dr. Fabiola Truong Phone: [phone], Fax: [phone]  Note from Dr. 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  -  MRI with and without contrast performed on 12/12/23 showing a 1.5 x 1.2 x 1.5cm right sided pituitary macroadenoma. -  Plan was made to see endocrinology asap for GH  macroadenoma management.  -  Growth Hormone 12.8 preop (03/11/24), now 0.91 post op (05/20/24) -  IGF1 1314 preop (03/11/24), now 458 post op (05/20/24)  -  05/02/24: MRI: Impression: Postoperative changes related to endoscopic transsphenoidal approach resection of the previously seen pituitary lesion. Residual disease is not entirely excluded on this examination. No mass effect on the optic apparatus. - 03/11/24 A1c 7.3%  05/24/2024  Pt presents today for endocrine evaluation. Accompanied by his Wife. Pt reports that for the past 4 yrs, there have been times where he did not feel like himself. He endorsed feeling dizzy, occasional HAs, feeling very tired to the point of fatigue. Also reported feeling unmotivated, and markedly diminished libido and sexual activities. In 2020, he went to [facility] for a general exam and was informed he has arthritis and DM. Pt eventually saw his PCP who referred him to see a Neurologist. The Neurologist ordered and MRI on 12/23 and saw " shadows" in his brain. Detected the pituitary tumor. He had the surgery on 04/11/24. Since then, his HAs have significantly subsided. They occur occasionally but are lighter. Continues to feel fatigue and experiencing decreased libido. Reports that his peripheral vision is okay, and he has decreased hand and face swelling. Denies n/v, nocturia, polydipsia. FBS: 120. PPBS: Below 200.   [Medications verified on 05/24/2024 as per pt]  Current Medications: Hydrocortisone 10 mg 2 tabs am and 1 tab in the afternoon, Metofmrin 850 mg BID, Atorvastatin 10, Ozempic 0.25 mg qw by rx PCP (for the past 6 months)

## 2024-05-27 NOTE — DATA REVIEWED
[FreeTextEntry1] : Imaging: - 05/02/24: MRI: Impression: Postoperative changes related to endoscopic transsphenoidal approach resection of the previously seen pituitary lesion. Residual disease is not entirely excluded on this examination. No mass effect on the optic apparatus. - 04/11/24: Pathology: Pituitary NET of somatotroph differentiation.  Note from YAJAIRA DICKENS: 05/20/24: - MRI with and without contrast performed on 12/12/23 showing a 1.5 x 1.2 x 1.5cm right sided pituitary macroadenoma.

## 2024-05-27 NOTE — PHYSICAL EXAM
[Alert] : alert [Well Nourished] : well nourished [No Acute Distress] : no acute distress [Well Developed] : well developed [Normal Sclera/Conjunctiva] : normal sclera/conjunctiva [EOMI] : extra ocular movement intact [No Proptosis] : no proptosis [Normal Oropharynx] : the oropharynx was normal [Thyroid Not Enlarged] : the thyroid was not enlarged [No Thyroid Nodules] : no palpable thyroid nodules [No Respiratory Distress] : no respiratory distress [No Accessory Muscle Use] : no accessory muscle use [Clear to Auscultation] : lungs were clear to auscultation bilaterally [Normal S1, S2] : normal S1 and S2 [Normal Rate] : heart rate was normal [Regular Rhythm] : with a regular rhythm [No Edema] : no peripheral edema [Pedal Pulses Normal] : the pedal pulses are present [Normal Bowel Sounds] : normal bowel sounds [Not Tender] : non-tender [Not Distended] : not distended [Soft] : abdomen soft [Normal Anterior Cervical Nodes] : no anterior cervical lymphadenopathy [Normal Posterior Cervical Nodes] : no posterior cervical lymphadenopathy [No Spinal Tenderness] : no spinal tenderness [Spine Straight] : spine straight [No Stigmata of Cushings Syndrome] : no stigmata of Cushings Syndrome [Normal Gait] : normal gait [Normal Strength/Tone] : muscle strength and tone were normal [No Rash] : no rash [Normal Reflexes] : deep tendon reflexes were 2+ and symmetric [No Tremors] : no tremors [Oriented x3] : oriented to person, place, and time [Acanthosis Nigricans] : no acanthosis nigricans [de-identified] :  Prominent forehead, and jaw, Enlarged hand teeth gap, and deepened voice. [de-identified] : Thyroid is not palpated. [de-identified] :  Heel pad, increased length of heel pad. Swelling on hands and face.

## 2024-05-28 LAB — GLUCOSE BLDC GLUCOMTR-MCNC: 104

## 2024-05-29 LAB
CORTICOSTEROID BIND GLOBULIN: 2.5 MG/DL
CORTIS SERPL-MCNC: 26 UG/DL
CORTISOL, FREE: 9.4 UG/DL
MONOMERIC PROLACTIN (ICMA)*: 5.94 NG/ML
PERCENT MACROPROLACTIN: 11 %
PFCX: 36 %
PROLACTIN, SERUM (ICMA)*: 6.68 NG/ML

## 2024-06-12 NOTE — PRE-OP CHECKLIST - BLOOD AVAILABLE
[FreeTextEntry1] : rv in 9 mows, then repeat echo Medications reviewed reviewed and reconciled with the patient Patient is compliant with taking appropriate medications at appropriate dosages at appropriate intervals without missing dosages.  yes

## 2024-06-13 ENCOUNTER — APPOINTMENT (OUTPATIENT)
Dept: ENDOCRINOLOGY | Facility: CLINIC | Age: 34
End: 2024-06-13
Payer: MEDICAID

## 2024-06-13 VITALS
BODY MASS INDEX: 30.11 KG/M2 | SYSTOLIC BLOOD PRESSURE: 123 MMHG | DIASTOLIC BLOOD PRESSURE: 74 MMHG | WEIGHT: 170 LBS | HEART RATE: 70 BPM

## 2024-06-13 DIAGNOSIS — E22.0 ACROMEGALY AND PITUITARY GIGANTISM: ICD-10-CM

## 2024-06-13 LAB
ACTH SER-ACNC: 20.5 PG/ML
ALBUMIN SERPL ELPH-MCNC: 4.7 G/DL
ALP BLD-CCNC: 79 U/L
ALT SERPL-CCNC: 23 U/L
ANION GAP SERPL CALC-SCNC: 12 MMOL/L
AST SERPL-CCNC: 18 U/L
BILIRUB SERPL-MCNC: 0.4 MG/DL
BUN SERPL-MCNC: 13 MG/DL
CALCIUM SERPL-MCNC: 9.4 MG/DL
CHLORIDE SERPL-SCNC: 104 MMOL/L
CO2 SERPL-SCNC: 25 MMOL/L
CORTIS SERPL-MCNC: 8.2 UG/DL
CREAT SERPL-MCNC: 1 MG/DL
EGFR: 100 ML/MIN/1.73M2
GH SERPL-MCNC: 1.37 NG/ML
GLUCOSE SERPL-MCNC: 107 MG/DL
HCT VFR BLD CALC: 42.3 %
HGB BLD-MCNC: 13.7 G/DL
IGF-1 INTERP: NORMAL
IGF-I BLD-MCNC: 432 NG/ML
LH SERPL-ACNC: 3.1 IU/L
MCHC RBC-ENTMCNC: 30.6 PG
MCHC RBC-ENTMCNC: 32.4 GM/DL
MCV RBC AUTO: 94.4 FL
PLATELET # BLD AUTO: 255 K/UL
POTASSIUM SERPL-SCNC: 4.5 MMOL/L
PROLACTIN SERPL-MCNC: 7.8 NG/ML
PROT SERPL-MCNC: 7.4 G/DL
RBC # BLD: 4.48 M/UL
RBC # FLD: 13 %
SHBG SERPL-SCNC: 11.4 NMOL/L
SODIUM SERPL-SCNC: 140 MMOL/L
T4 FREE SERPL-MCNC: 1.3 NG/DL
TESTOST SERPL-MCNC: 336 NG/DL
TSH SERPL-ACNC: 0.7 UIU/ML
WBC # FLD AUTO: 3.9 K/UL

## 2024-06-13 PROCEDURE — 99215 OFFICE O/P EST HI 40 MIN: CPT

## 2024-06-19 PROBLEM — E22.0 ACROMEGALY: Status: ACTIVE | Noted: 2024-05-24

## 2024-06-19 NOTE — PHYSICAL EXAM
[Alert] : alert [Well Nourished] : well nourished [No Acute Distress] : no acute distress [Well Developed] : well developed [Normal Sclera/Conjunctiva] : normal sclera/conjunctiva [EOMI] : extra ocular movement intact [No Proptosis] : no proptosis [Normal Oropharynx] : the oropharynx was normal [Thyroid Not Enlarged] : the thyroid was not enlarged [No Thyroid Nodules] : no palpable thyroid nodules [No Respiratory Distress] : no respiratory distress [No Accessory Muscle Use] : no accessory muscle use [Clear to Auscultation] : lungs were clear to auscultation bilaterally [Normal S1, S2] : normal S1 and S2 [Normal Rate] : heart rate was normal [Regular Rhythm] : with a regular rhythm [No Edema] : no peripheral edema [Pedal Pulses Normal] : the pedal pulses are present [Normal Bowel Sounds] : normal bowel sounds [Not Tender] : non-tender [Not Distended] : not distended [Soft] : abdomen soft [Normal Anterior Cervical Nodes] : no anterior cervical lymphadenopathy [No Spinal Tenderness] : no spinal tenderness [Spine Straight] : spine straight [No Stigmata of Cushings Syndrome] : no stigmata of Cushings Syndrome [Normal Gait] : normal gait [Normal Strength/Tone] : muscle strength and tone were normal [No Rash] : no rash [Normal Reflexes] : deep tendon reflexes were 2+ and symmetric [No Tremors] : no tremors [Oriented x3] : oriented to person, place, and time [Acanthosis Nigricans] : no acanthosis nigricans [de-identified] :  Prominent forehead, and jaw, Enlarged hand teeth gap, and deepened voice. [de-identified] : Thyroid is not palpated. [de-identified] :  Heel pad, increased length of heel pad. Swelling on hands and face.

## 2024-06-19 NOTE — REASON FOR VISIT
[Follow - Up] : a follow-up visit [Other___] : [unfilled] [Spouse] : spouse [FreeTextEntry2] : Dr. Fabiola Truong

## 2024-06-19 NOTE — END OF VISIT
[FreeTextEntry3] :  All medical record entries made by the Scribe were at my, Dr. Eulalio Bain, direction and personally dictated by me on 06/13/2024. I have reviewed the chart and agree that the record accurately reflects my personal performance of the history, physical exam, assessment and plan. I have also personally directed, reviewed and agreed with the chart. [Time Spent: ___ minutes] : I have spent [unfilled] minutes of time on the encounter.

## 2024-06-27 PROBLEM — D35.2 PITUITARY ADENOMA: Status: ACTIVE | Noted: 2024-03-11

## 2024-06-27 PROBLEM — Z78.9 CURRENT NON-SMOKER: Status: ACTIVE | Noted: 2024-04-18

## 2024-06-27 PROBLEM — Z86.39 HISTORY OF DIABETES MELLITUS: Status: RESOLVED | Noted: 2024-03-12 | Resolved: 2024-06-27

## 2024-06-27 PROBLEM — Z86.39 HISTORY OF HYPERLIPIDEMIA: Status: RESOLVED | Noted: 2024-03-12 | Resolved: 2024-06-27

## 2024-06-27 PROBLEM — Z98.890 S/P SELECTIVE TRANSSPHENOIDAL PITUITARY ADENOMECTOMY: Status: ACTIVE | Noted: 2024-05-16

## 2024-07-01 ENCOUNTER — APPOINTMENT (OUTPATIENT)
Age: 34
End: 2024-07-01
Payer: COMMERCIAL

## 2024-07-01 PROCEDURE — D9310: CPT

## 2024-07-01 PROCEDURE — D0330 PANORAMIC RADIOGRAPHIC IMAGE: CPT

## 2024-07-05 ENCOUNTER — APPOINTMENT (OUTPATIENT)
Dept: NEUROSURGERY | Facility: CLINIC | Age: 34
End: 2024-07-05
Payer: MEDICAID

## 2024-07-05 DIAGNOSIS — D35.2 BENIGN NEOPLASM OF PITUITARY GLAND: ICD-10-CM

## 2024-07-05 DIAGNOSIS — E22.0 ACROMEGALY AND PITUITARY GIGANTISM: ICD-10-CM

## 2024-07-05 DIAGNOSIS — Z98.890 OTHER SPECIFIED POSTPROCEDURAL STATES: ICD-10-CM

## 2024-07-05 DIAGNOSIS — Z86.018 OTHER SPECIFIED POSTPROCEDURAL STATES: ICD-10-CM

## 2024-07-05 DIAGNOSIS — Z86.39 PERSONAL HISTORY OF OTHER ENDOCRINE, NUTRITIONAL AND METABOLIC DISEASE: ICD-10-CM

## 2024-07-05 DIAGNOSIS — Z78.9 OTHER SPECIFIED HEALTH STATUS: ICD-10-CM

## 2024-07-05 PROCEDURE — 99024 POSTOP FOLLOW-UP VISIT: CPT

## 2024-07-10 ENCOUNTER — APPOINTMENT (OUTPATIENT)
Age: 34
End: 2024-07-10
Payer: COMMERCIAL

## 2024-07-10 PROCEDURE — D1110 PROPHYLAXIS - ADULT: CPT

## 2024-07-10 PROCEDURE — D0150: CPT

## 2024-07-10 PROCEDURE — D2391: CPT

## 2024-07-10 PROCEDURE — D0210: CPT

## 2024-07-16 ENCOUNTER — APPOINTMENT (OUTPATIENT)
Dept: OTOLARYNGOLOGY | Facility: CLINIC | Age: 34
End: 2024-07-16
Payer: MEDICAID

## 2024-07-16 DIAGNOSIS — E22.0 ACROMEGALY AND PITUITARY GIGANTISM: ICD-10-CM

## 2024-07-16 PROCEDURE — 99213 OFFICE O/P EST LOW 20 MIN: CPT | Mod: 25

## 2024-07-16 PROCEDURE — 31231 NASAL ENDOSCOPY DX: CPT

## 2024-07-23 ENCOUNTER — APPOINTMENT (OUTPATIENT)
Age: 34
End: 2024-07-23
Payer: COMMERCIAL

## 2024-07-30 ENCOUNTER — APPOINTMENT (OUTPATIENT)
Age: 34
End: 2024-07-30

## 2024-08-01 ENCOUNTER — APPOINTMENT (OUTPATIENT)
Age: 34
End: 2024-08-01
Payer: COMMERCIAL

## 2024-08-01 PROCEDURE — D2391: CPT

## 2024-08-08 ENCOUNTER — APPOINTMENT (OUTPATIENT)
Dept: ENDOCRINOLOGY | Facility: CLINIC | Age: 34
End: 2024-08-08

## 2024-08-08 PROCEDURE — 99215 OFFICE O/P EST HI 40 MIN: CPT

## 2024-08-08 NOTE — PHYSICAL EXAM
[No Acute Distress] : no acute distress [Normal Sclera/Conjunctiva] : normal sclera/conjunctiva [No Proptosis] : no proptosis [Normal Outer Ear/Nose] : the ears and nose were normal in appearance [Normal Oropharynx] : the oropharynx was normal [Thyroid Not Enlarged] : the thyroid was not enlarged [No Thyroid Nodules] : no palpable thyroid nodules [Clear to Auscultation] : lungs were clear to auscultation bilaterally [Normal Rate] : heart rate was normal [No Edema] : no peripheral edema [Pedal Pulses Normal] : the pedal pulses are present [Soft] : abdomen soft [Spine Straight] : spine straight [No Stigmata of Cushings Syndrome] : no stigmata of Cushings Syndrome [Normal Gait] : normal gait [Normal Strength/Tone] : muscle strength and tone were normal [No Rash] : no rash [Normal Reflexes] : deep tendon reflexes were 2+ and symmetric [No Tremors] : no tremors [Oriented x3] : oriented to person, place, and time [Kyphosis] : no kyphosis present [Acanthosis Nigricans] : no acanthosis nigricans [de-identified] : Coarse facial features have improved. [de-identified] : No visual field impairment. [de-identified] : Thyroid is not palpated. [de-identified] :  Heel pad, increased length of heel pad. Swelling on hands and face.

## 2024-08-08 NOTE — ADDENDUM
[FreeTextEntry1] : I, Angelo You act solely as a scribe for Dr. Eulalio Bain on this date 08/08/2024

## 2024-08-08 NOTE — END OF VISIT
[FreeTextEntry3] :  All medical record entries made by the Scribe were at my, Dr. Eulalio Bain, direction and personally dictated by me on 08/08/2024. I have reviewed the chart and agree that the record accurately reflects my personal performance of the history, physical exam, assessment and plan. I have also personally directed, reviewed and agreed with the chart.

## 2024-08-08 NOTE — DATA REVIEWED
[FreeTextEntry1] : Imaging: - 05/02/24: MRI: Impression: Postoperative changes related to endoscopic transsphenoidal approach resection of the previously seen pituitary lesion. Residual disease is not entirely excluded on this examination. No mass effect on the optic apparatus. - 04/11/24: Pathology: Pituitary NET of somatotroph differentiation.  Note from YAJAIRA DICKENS: 05/20/24: - MRI with and without contrast performed on 12/12/23 showing a 1.5 x 1.2 x 1.5cm right sided pituitary macroadenoma.  Reviewed labs: - 07/30/24 SHBG 12.5, total testosterone 176, Growth Hormone 1.57, IGF-1 438

## 2024-08-08 NOTE — HISTORY OF PRESENT ILLNESS
[FreeTextEntry1] : 34 year old M pt, with Hx of Acromegaly, referred by Fabiola Truong, presents today to establish endocrine care.  Other PMHx: DM dx 2020, HLD, Arthritis FHx: Mother: HTN No FHx of: Thyroid Disorder, MEN, or pituitary disorders. SHx: Non smoker, No etOH use. COVID Infection: 2019. COVID vaccinated. Appt with Dentist on 07/24 NKDA PCP: Dr. Fabiola Truong Phone: [phone], Fax: [phone]  Note from Dr. 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  -  MRI with and without contrast performed on 12/12/23 showing a 1.5 x 1.2 x 1.5cm right sided pituitary macroadenoma. -  Plan was made to see endocrinology asap for GH  macroadenoma management.  -  Growth Hormone 12.8 preop (03/11/24), now 0.91 post op (05/20/24) -  IGF1 1314 preop (03/11/24), now 458 post op (05/20/24)  -  05/02/24: MRI: Impression: Postoperative changes related to endoscopic transsphenoidal approach resection of the previously seen pituitary lesion. Residual disease is not entirely excluded on this examination. No mass effect on the optic apparatus. - 03/11/24 A1c 7.3%  05/24/2024  Pt presents today for endocrine evaluation. Accompanied by his Wife. Pt reports that for the past 4 yrs, there have been times where he did not feel like himself. He endorsed feeling dizzy, occasional HAs, feeling very tired to the point of fatigue. Also reported feeling unmotivated, and markedly diminished libido and sexual activities. In 2020, he went to [facility] for a general exam and was informed he has arthritis and DM. Pt eventually saw his PCP who referred him to see a Neurologist. The Neurologist ordered and MRI on 12/23 and saw " shadows" in his brain. Detected the pituitary tumor. He had the surgery on 04/11/24. Since then, his HAs have significantly subsided. They occur occasionally but are lighter. Continues to feel fatigue and experiencing decreased libido. Reports that his peripheral vision is okay, and he has decreased hand and face swelling. Denies n/v, nocturia, polydipsia. FBS: 120. PPBS: Below 200.   06/13/2024  Pt has /74 and BMI 30.11. No significant weight change.  CC: "I'm doing alright." 04/10/24 Transsphenoidal resection of pituitary neuroendocrine tumor.  FBS below 110, PPBS below 150  08/08/2024  Pt has /74 and BMI 30.11. No significant weight change.  CC: "I'm feeling well, no complaints. Pt reports that he feels a lot better. He underwent surgery 4 months ago and is recovering well.  Pt states swelling in hands and face have improved.  - 07/30/24 SHBG 12.5, total testosterone 176, Growth Hormone 1.57, IGF-1 438  [Medications verified as per pt on 08/08/2024] Current Medications: Metformin 500 mg BID Held: Ozempic 0.25 mg qw by rx PCP (for the past 6 months), Atorvastatin 20 mg qd, Hydrocortisone 10 mg bid (Decreased from 30 mg qd),

## 2024-08-15 ENCOUNTER — APPOINTMENT (OUTPATIENT)
Age: 34
End: 2024-08-15

## 2024-08-21 ENCOUNTER — NON-APPOINTMENT (OUTPATIENT)
Age: 34
End: 2024-08-21

## 2024-08-21 RX ORDER — PASIREOTIDE 40 MG
40 KIT INTRAMUSCULAR
Qty: 1 | Refills: 11 | Status: ACTIVE | COMMUNITY
Start: 2024-08-19

## 2024-08-21 RX ORDER — PASIREOTIDE 40 MG
40 KIT INTRAMUSCULAR
Qty: 1 | Refills: 11 | Status: ACTIVE | OUTPATIENT
Start: 2024-08-21

## 2024-09-19 ENCOUNTER — APPOINTMENT (OUTPATIENT)
Dept: ENDOCRINOLOGY | Facility: CLINIC | Age: 34
End: 2024-09-19
Payer: MEDICAID

## 2024-09-19 VITALS
SYSTOLIC BLOOD PRESSURE: 135 MMHG | HEART RATE: 78 BPM | DIASTOLIC BLOOD PRESSURE: 84 MMHG | WEIGHT: 180 LBS | BODY MASS INDEX: 31.89 KG/M2

## 2024-09-19 DIAGNOSIS — E22.0 ACROMEGALY AND PITUITARY GIGANTISM: ICD-10-CM

## 2024-09-19 PROCEDURE — 96372 THER/PROPH/DIAG INJ SC/IM: CPT

## 2024-09-19 RX ORDER — PASIREOTIDE 40 MG
40 KIT INTRAMUSCULAR
Qty: 0 | Refills: 0 | Status: COMPLETED | OUTPATIENT
Start: 2024-09-19

## 2024-09-19 RX ADMIN — PASIREOTIDE 0 MG: KIT at 00:00

## 2024-09-19 NOTE — PROCEDURE
[FreeTextEntry1] : Presents for first dose of Signifor LAR 40mg IM suspension to be given once every 4 weeks.   ID: [id number]  Reviewed potential side effects of Signifor including hyperglycemia, hepatic injury and EKG changes -- advised him to check FBS daily until next injection, monitor for any darkening of urine, light colored stool, jaundice, abdominal pain, to monitor for any palpitations and follow up with PCP in ~1-2 months for repeat EKG. A1c and CMP ordered, to be done in 3 weeks. He is aware and agreed.  Signifor LAR 40mg (NDC: 17952-741-41 SN: [id number], Lot: [id number], Exp: 02/2026) given intramuscularly to left gluteal region without complication.  Medication kept refrigerated after delivery to pt's home, removed from refrigerator at least 30 minutes prior to injection, and given according to package instructions. Aspiration done to confirm IM location.   Advised to have medication sent to office next time to ensure safe storage, ease of coordination.  Return in 4 weeks for next injection.

## 2024-10-18 ENCOUNTER — APPOINTMENT (OUTPATIENT)
Dept: ENDOCRINOLOGY | Facility: CLINIC | Age: 34
End: 2024-10-18

## 2024-10-18 VITALS
HEART RATE: 72 BPM | SYSTOLIC BLOOD PRESSURE: 143 MMHG | BODY MASS INDEX: 30.82 KG/M2 | DIASTOLIC BLOOD PRESSURE: 78 MMHG | WEIGHT: 174 LBS

## 2024-10-18 PROCEDURE — 96372 THER/PROPH/DIAG INJ SC/IM: CPT

## 2024-10-18 RX ORDER — PASIREOTIDE 40 MG
40 KIT INTRAMUSCULAR
Qty: 0 | Refills: 0 | Status: COMPLETED | OUTPATIENT
Start: 2024-10-18

## 2024-10-18 RX ADMIN — PASIREOTIDE 0 MG: KIT at 00:00

## 2024-10-22 ENCOUNTER — NON-APPOINTMENT (OUTPATIENT)
Age: 34
End: 2024-10-22

## 2024-10-22 ENCOUNTER — APPOINTMENT (OUTPATIENT)
Dept: OTOLARYNGOLOGY | Facility: CLINIC | Age: 34
End: 2024-10-22
Payer: MEDICAID

## 2024-10-22 DIAGNOSIS — D35.2 BENIGN NEOPLASM OF PITUITARY GLAND: ICD-10-CM

## 2024-10-22 DIAGNOSIS — E22.0 ACROMEGALY AND PITUITARY GIGANTISM: ICD-10-CM

## 2024-10-22 PROCEDURE — 99213 OFFICE O/P EST LOW 20 MIN: CPT | Mod: 25

## 2024-10-22 PROCEDURE — 31231 NASAL ENDOSCOPY DX: CPT

## 2024-11-05 ENCOUNTER — APPOINTMENT (OUTPATIENT)
Dept: ENDOCRINOLOGY | Facility: CLINIC | Age: 34
End: 2024-11-05
Payer: MEDICAID

## 2024-11-05 VITALS
HEIGHT: 63 IN | BODY MASS INDEX: 31.36 KG/M2 | SYSTOLIC BLOOD PRESSURE: 143 MMHG | HEART RATE: 80 BPM | DIASTOLIC BLOOD PRESSURE: 80 MMHG | WEIGHT: 177 LBS

## 2024-11-05 DIAGNOSIS — E11.9 TYPE 2 DIABETES MELLITUS W/OUT COMPLICATIONS: ICD-10-CM

## 2024-11-05 DIAGNOSIS — E22.0 ACROMEGALY AND PITUITARY GIGANTISM: ICD-10-CM

## 2024-11-05 PROCEDURE — 99215 OFFICE O/P EST HI 40 MIN: CPT

## 2024-11-05 PROCEDURE — G2211 COMPLEX E/M VISIT ADD ON: CPT | Mod: NC

## 2024-11-08 RX ORDER — SITAGLIPTIN AND METFORMIN HYDROCHLORIDE 50; 1000 MG/1; MG/1
50-1000 TABLET, FILM COATED ORAL
Qty: 90 | Refills: 2 | Status: ACTIVE | COMMUNITY
Start: 2024-11-05 | End: 1900-01-01

## 2024-11-19 ENCOUNTER — APPOINTMENT (OUTPATIENT)
Dept: ENDOCRINOLOGY | Facility: CLINIC | Age: 34
End: 2024-11-19
Payer: MEDICAID

## 2024-11-19 VITALS
WEIGHT: 177 LBS | HEART RATE: 76 BPM | DIASTOLIC BLOOD PRESSURE: 77 MMHG | SYSTOLIC BLOOD PRESSURE: 136 MMHG | BODY MASS INDEX: 31.35 KG/M2

## 2024-11-19 DIAGNOSIS — E22.0 ACROMEGALY AND PITUITARY GIGANTISM: ICD-10-CM

## 2024-11-19 PROCEDURE — 96372 THER/PROPH/DIAG INJ SC/IM: CPT

## 2024-11-19 RX ORDER — PASIREOTIDE 40 MG
40 KIT INTRAMUSCULAR
Qty: 0 | Refills: 0 | Status: COMPLETED | OUTPATIENT
Start: 2024-11-19

## 2024-11-19 RX ADMIN — PASIREOTIDE 0 MG: KIT at 00:00

## 2024-12-15 ENCOUNTER — OUTPATIENT (OUTPATIENT)
Dept: OUTPATIENT SERVICES | Facility: HOSPITAL | Age: 34
LOS: 1 days | End: 2024-12-15
Payer: MEDICAID

## 2024-12-15 PROCEDURE — 70553 MRI BRAIN STEM W/O & W/DYE: CPT | Mod: 26

## 2024-12-15 PROCEDURE — A9585: CPT

## 2024-12-15 PROCEDURE — 70553 MRI BRAIN STEM W/O & W/DYE: CPT

## 2024-12-19 ENCOUNTER — APPOINTMENT (OUTPATIENT)
Dept: ENDOCRINOLOGY | Facility: CLINIC | Age: 34
End: 2024-12-19

## 2024-12-19 VITALS
WEIGHT: 176 LBS | BODY MASS INDEX: 31.18 KG/M2 | DIASTOLIC BLOOD PRESSURE: 77 MMHG | SYSTOLIC BLOOD PRESSURE: 140 MMHG | HEART RATE: 68 BPM

## 2024-12-19 PROBLEM — Z86.39 HISTORY OF DIABETES MELLITUS: Status: RESOLVED | Noted: 2024-03-12 | Resolved: 2024-12-19

## 2024-12-19 PROBLEM — Z86.39 HISTORY OF HYPERLIPIDEMIA: Status: RESOLVED | Noted: 2024-03-12 | Resolved: 2024-12-19

## 2024-12-19 LAB
GLUCOSE BLDC GLUCOMTR-MCNC: 149
HBA1C MFR BLD HPLC: 8.3

## 2024-12-19 PROCEDURE — 96372 THER/PROPH/DIAG INJ SC/IM: CPT

## 2024-12-19 PROCEDURE — 82962 GLUCOSE BLOOD TEST: CPT

## 2024-12-19 PROCEDURE — 83036 HEMOGLOBIN GLYCOSYLATED A1C: CPT | Mod: QW

## 2024-12-19 RX ORDER — GLIMEPIRIDE 4 MG/1
4 TABLET ORAL DAILY
Qty: 90 | Refills: 1 | Status: ACTIVE | COMMUNITY
Start: 2024-12-19 | End: 1900-01-01

## 2024-12-19 RX ORDER — PASIREOTIDE 40 MG
40 KIT INTRAMUSCULAR
Qty: 0 | Refills: 0 | Status: COMPLETED | OUTPATIENT
Start: 2024-12-19

## 2024-12-19 RX ADMIN — PASIREOTIDE 0 MG: KIT at 00:00

## 2024-12-20 ENCOUNTER — APPOINTMENT (OUTPATIENT)
Dept: NEUROSURGERY | Facility: CLINIC | Age: 34
End: 2024-12-20
Payer: MEDICAID

## 2024-12-20 DIAGNOSIS — Z86.39 PERSONAL HISTORY OF OTHER ENDOCRINE, NUTRITIONAL AND METABOLIC DISEASE: ICD-10-CM

## 2024-12-20 DIAGNOSIS — Z86.018 OTHER SPECIFIED POSTPROCEDURAL STATES: ICD-10-CM

## 2024-12-20 DIAGNOSIS — D35.2 BENIGN NEOPLASM OF PITUITARY GLAND: ICD-10-CM

## 2024-12-20 DIAGNOSIS — Z98.890 OTHER SPECIFIED POSTPROCEDURAL STATES: ICD-10-CM

## 2024-12-20 DIAGNOSIS — Z78.9 OTHER SPECIFIED HEALTH STATUS: ICD-10-CM

## 2024-12-20 PROCEDURE — 99215 OFFICE O/P EST HI 40 MIN: CPT

## 2025-01-16 ENCOUNTER — APPOINTMENT (OUTPATIENT)
Dept: ENDOCRINOLOGY | Facility: CLINIC | Age: 35
End: 2025-01-16

## 2025-01-16 VITALS
WEIGHT: 180 LBS | BODY MASS INDEX: 31.89 KG/M2 | SYSTOLIC BLOOD PRESSURE: 134 MMHG | HEART RATE: 88 BPM | DIASTOLIC BLOOD PRESSURE: 84 MMHG

## 2025-01-16 DIAGNOSIS — E22.0 ACROMEGALY AND PITUITARY GIGANTISM: ICD-10-CM

## 2025-01-16 DIAGNOSIS — E11.9 TYPE 2 DIABETES MELLITUS W/OUT COMPLICATIONS: ICD-10-CM

## 2025-01-16 PROCEDURE — 96372 THER/PROPH/DIAG INJ SC/IM: CPT

## 2025-01-16 RX ORDER — PASIREOTIDE 40 MG
40 KIT INTRAMUSCULAR
Qty: 0 | Refills: 0 | Status: COMPLETED | OUTPATIENT
Start: 2025-01-16

## 2025-01-16 RX ADMIN — PASIREOTIDE 0 MG: KIT at 00:00

## 2025-02-13 ENCOUNTER — APPOINTMENT (OUTPATIENT)
Dept: ENDOCRINOLOGY | Facility: CLINIC | Age: 35
End: 2025-02-13
Payer: SELF-PAY

## 2025-02-13 VITALS
SYSTOLIC BLOOD PRESSURE: 126 MMHG | BODY MASS INDEX: 32.6 KG/M2 | HEART RATE: 74 BPM | HEIGHT: 63 IN | WEIGHT: 184 LBS | DIASTOLIC BLOOD PRESSURE: 84 MMHG

## 2025-02-13 DIAGNOSIS — E11.9 TYPE 2 DIABETES MELLITUS W/OUT COMPLICATIONS: ICD-10-CM

## 2025-02-13 DIAGNOSIS — E22.0 ACROMEGALY AND PITUITARY GIGANTISM: ICD-10-CM

## 2025-02-13 LAB
GLUCOSE BLDC GLUCOMTR-MCNC: 124
HBA1C MFR BLD HPLC: 7.8

## 2025-02-13 PROCEDURE — 99214 OFFICE O/P EST MOD 30 MIN: CPT

## 2025-02-13 RX ORDER — METFORMIN ER 500 MG 500 MG/1
500 TABLET ORAL
Qty: 120 | Refills: 5 | Status: ACTIVE | COMMUNITY
Start: 2025-02-13 | End: 1900-01-01

## 2025-02-13 RX ORDER — PASIREOTIDE 40 MG
40 KIT INTRAMUSCULAR
Qty: 0 | Refills: 0 | Status: COMPLETED | OUTPATIENT
Start: 2025-02-13

## 2025-02-13 RX ORDER — SEMAGLUTIDE 0.68 MG/ML
2 INJECTION, SOLUTION SUBCUTANEOUS
Qty: 1 | Refills: 3 | Status: ACTIVE | COMMUNITY
Start: 2025-02-13 | End: 1900-01-01

## 2025-02-13 RX ADMIN — PASIREOTIDE 0 MG: KIT at 00:00

## 2025-02-21 NOTE — PATIENT PROFILE ADULT - NSPROHMDIABETMGMTSTRAT_GEN_A_NUR
Refill request from pharmacy  for     famotidine (PEPCID) 20 MG tablet 180 tablet 3 1/24/2024 --    Sig - Route: Take 1 tablet by mouth 2 times daily as needed (Reflux symptoms). - Oral        Last office visit 12/5/24,   Next office visit 6/12/25    Refilled per protocol.      blood glucose testing

## 2025-03-03 ENCOUNTER — NON-APPOINTMENT (OUTPATIENT)
Age: 35
End: 2025-03-03

## 2025-03-18 ENCOUNTER — APPOINTMENT (OUTPATIENT)
Dept: ENDOCRINOLOGY | Facility: CLINIC | Age: 35
End: 2025-03-18

## 2025-03-19 ENCOUNTER — RESULT CHARGE (OUTPATIENT)
Age: 35
End: 2025-03-19

## 2025-03-19 ENCOUNTER — APPOINTMENT (OUTPATIENT)
Dept: ENDOCRINOLOGY | Facility: CLINIC | Age: 35
End: 2025-03-19
Payer: MEDICAID

## 2025-03-19 VITALS
BODY MASS INDEX: 31.89 KG/M2 | SYSTOLIC BLOOD PRESSURE: 144 MMHG | WEIGHT: 180 LBS | DIASTOLIC BLOOD PRESSURE: 85 MMHG | HEART RATE: 85 BPM

## 2025-03-19 DIAGNOSIS — E22.0 ACROMEGALY AND PITUITARY GIGANTISM: ICD-10-CM

## 2025-03-19 LAB
GLUCOSE BLDC GLUCOMTR-MCNC: 206
HBA1C MFR BLD HPLC: 8.1

## 2025-03-19 PROCEDURE — 96372 THER/PROPH/DIAG INJ SC/IM: CPT

## 2025-03-19 RX ORDER — PASIREOTIDE 40 MG
40 KIT INTRAMUSCULAR
Qty: 0 | Refills: 0 | Status: COMPLETED | OUTPATIENT
Start: 2025-03-19

## 2025-03-19 RX ADMIN — PASIREOTIDE 0 MG: KIT at 00:00

## 2025-04-17 ENCOUNTER — APPOINTMENT (OUTPATIENT)
Dept: ENDOCRINOLOGY | Facility: CLINIC | Age: 35
End: 2025-04-17

## 2025-04-17 ENCOUNTER — APPOINTMENT (OUTPATIENT)
Dept: ENDOCRINOLOGY | Facility: CLINIC | Age: 35
End: 2025-04-17
Payer: MEDICAID

## 2025-04-17 VITALS
WEIGHT: 178 LBS | HEART RATE: 66 BPM | BODY MASS INDEX: 31.53 KG/M2 | SYSTOLIC BLOOD PRESSURE: 115 MMHG | DIASTOLIC BLOOD PRESSURE: 77 MMHG

## 2025-04-17 DIAGNOSIS — E11.9 TYPE 2 DIABETES MELLITUS W/OUT COMPLICATIONS: ICD-10-CM

## 2025-04-17 LAB
GLUCOSE BLDC GLUCOMTR-MCNC: 153
HBA1C MFR BLD HPLC: 7.8

## 2025-04-17 PROCEDURE — 99215 OFFICE O/P EST HI 40 MIN: CPT | Mod: 25

## 2025-04-17 PROCEDURE — 96372 THER/PROPH/DIAG INJ SC/IM: CPT

## 2025-04-17 RX ORDER — PASIREOTIDE 40 MG
40 KIT INTRAMUSCULAR
Qty: 0 | Refills: 0 | Status: COMPLETED | OUTPATIENT
Start: 2025-04-16

## 2025-04-22 ENCOUNTER — APPOINTMENT (OUTPATIENT)
Dept: GASTROENTEROLOGY | Facility: CLINIC | Age: 35
End: 2025-04-22

## 2025-04-22 ENCOUNTER — NON-APPOINTMENT (OUTPATIENT)
Age: 35
End: 2025-04-22

## 2025-04-22 VITALS
TEMPERATURE: 97.7 F | RESPIRATION RATE: 16 BRPM | SYSTOLIC BLOOD PRESSURE: 112 MMHG | OXYGEN SATURATION: 100 % | HEIGHT: 63 IN | BODY MASS INDEX: 32.07 KG/M2 | DIASTOLIC BLOOD PRESSURE: 76 MMHG | WEIGHT: 181 LBS | HEART RATE: 84 BPM

## 2025-04-22 DIAGNOSIS — K21.9 GASTRO-ESOPHAGEAL REFLUX DISEASE W/OUT ESOPHAGITIS: ICD-10-CM

## 2025-04-22 DIAGNOSIS — R19.8 OTHER SPECIFIED SYMPTOMS AND SIGNS INVOLVING THE DIGESTIVE SYSTEM AND ABDOMEN: ICD-10-CM

## 2025-04-22 LAB
ALBUMIN SERPL ELPH-MCNC: 5.3 G/DL
ALP BLD-CCNC: 57 U/L
ALT SERPL-CCNC: 14 U/L
ANION GAP SERPL CALC-SCNC: 16 MMOL/L
AST SERPL-CCNC: 13 U/L
BILIRUB SERPL-MCNC: 0.5 MG/DL
BUN SERPL-MCNC: 15 MG/DL
CALCIUM SERPL-MCNC: 10.3 MG/DL
CHLORIDE SERPL-SCNC: 105 MMOL/L
CHOLEST SERPL-MCNC: 226 MG/DL
CO2 SERPL-SCNC: 22 MMOL/L
CREAT SERPL-MCNC: 1 MG/DL
CREAT SPEC-SCNC: 194 MG/DL
EGFRCR SERPLBLD CKD-EPI 2021: 100 ML/MIN/1.73M2
ESTIMATED AVERAGE GLUCOSE: 194 MG/DL
GH SERPL-MCNC: 0.81 NG/ML
GLUCOSE SERPL-MCNC: 138 MG/DL
HBA1C MFR BLD HPLC: 8.4 %
HDLC SERPL-MCNC: 60 MG/DL
IGF-1 INTERP: NORMAL
IGF-I BLD-MCNC: 286 NG/ML
LDLC SERPL-MCNC: 148 MG/DL
MICROALBUMIN 24H UR DL<=1MG/L-MCNC: 2.4 MG/DL
MICROALBUMIN/CREAT 24H UR-RTO: 13 MG/G
NONHDLC SERPL-MCNC: 166 MG/DL
POTASSIUM SERPL-SCNC: 5 MMOL/L
PROLACTIN SERPL-MCNC: 5.4 NG/ML
PROT SERPL-MCNC: 7.9 G/DL
SODIUM SERPL-SCNC: 143 MMOL/L
TRIGL SERPL-MCNC: 102 MG/DL

## 2025-04-22 PROCEDURE — 99204 OFFICE O/P NEW MOD 45 MIN: CPT

## 2025-04-22 PROCEDURE — G2211 COMPLEX E/M VISIT ADD ON: CPT | Mod: NC

## 2025-04-22 RX ORDER — SODIUM SULFATE, MAGNESIUM SULFATE, AND POTASSIUM CHLORIDE 17.75; 2.7; 2.25 G/1; G/1; G/1
1479-225-188 TABLET ORAL
Qty: 1 | Refills: 0 | Status: ACTIVE | COMMUNITY
Start: 2025-04-22 | End: 1900-01-01

## 2025-04-22 RX ORDER — TIRZEPATIDE 10 MG/.5ML
10 INJECTION, SOLUTION SUBCUTANEOUS
Refills: 0 | Status: ACTIVE | COMMUNITY

## 2025-04-29 ENCOUNTER — APPOINTMENT (OUTPATIENT)
Dept: OTOLARYNGOLOGY | Facility: CLINIC | Age: 35
End: 2025-04-29
Payer: MEDICAID

## 2025-04-29 DIAGNOSIS — D35.2 BENIGN NEOPLASM OF PITUITARY GLAND: ICD-10-CM

## 2025-04-29 DIAGNOSIS — E22.0 ACROMEGALY AND PITUITARY GIGANTISM: ICD-10-CM

## 2025-04-29 PROCEDURE — 31231 NASAL ENDOSCOPY DX: CPT

## 2025-04-29 PROCEDURE — 99213 OFFICE O/P EST LOW 20 MIN: CPT | Mod: 25

## 2025-05-01 ENCOUNTER — APPOINTMENT (OUTPATIENT)
Dept: GASTROENTEROLOGY | Facility: CLINIC | Age: 35
End: 2025-05-01

## 2025-05-01 PROCEDURE — 43239 EGD BIOPSY SINGLE/MULTIPLE: CPT

## 2025-05-01 PROCEDURE — 45378 DIAGNOSTIC COLONOSCOPY: CPT

## 2025-05-05 ENCOUNTER — APPOINTMENT (OUTPATIENT)
Dept: ENDOCRINOLOGY | Facility: CLINIC | Age: 35
End: 2025-05-05

## 2025-05-15 ENCOUNTER — APPOINTMENT (OUTPATIENT)
Dept: ENDOCRINOLOGY | Facility: CLINIC | Age: 35
End: 2025-05-15

## 2025-05-15 PROCEDURE — 96372 THER/PROPH/DIAG INJ SC/IM: CPT

## 2025-05-16 RX ORDER — PASIREOTIDE 40 MG
40 KIT INTRAMUSCULAR
Qty: 0 | Refills: 0 | Status: COMPLETED | OUTPATIENT
Start: 2025-05-14

## 2025-05-27 ENCOUNTER — APPOINTMENT (OUTPATIENT)
Dept: ENDOCRINOLOGY | Facility: CLINIC | Age: 35
End: 2025-05-27
Payer: MEDICAID

## 2025-05-27 DIAGNOSIS — E11.9 TYPE 2 DIABETES MELLITUS W/OUT COMPLICATIONS: ICD-10-CM

## 2025-05-27 DIAGNOSIS — E22.0 ACROMEGALY AND PITUITARY GIGANTISM: ICD-10-CM

## 2025-05-27 PROCEDURE — 99215 OFFICE O/P EST HI 40 MIN: CPT

## 2025-06-07 RX ORDER — DULAGLUTIDE 0.75 MG/.5ML
0.75 INJECTION, SOLUTION SUBCUTANEOUS
Qty: 1 | Refills: 3 | Status: ACTIVE | COMMUNITY
Start: 2025-05-27 | End: 1900-01-01

## 2025-06-12 ENCOUNTER — APPOINTMENT (OUTPATIENT)
Dept: ENDOCRINOLOGY | Facility: CLINIC | Age: 35
End: 2025-06-12

## 2025-06-12 PROCEDURE — 96372 THER/PROPH/DIAG INJ SC/IM: CPT

## 2025-06-12 RX ORDER — PASIREOTIDE 40 MG
40 KIT INTRAMUSCULAR
Qty: 0 | Refills: 0 | Status: COMPLETED | OUTPATIENT
Start: 2025-06-12

## 2025-07-11 ENCOUNTER — APPOINTMENT (OUTPATIENT)
Age: 35
End: 2025-07-11
Payer: MEDICAID

## 2025-07-11 ENCOUNTER — APPOINTMENT (OUTPATIENT)
Age: 35
End: 2025-07-11

## 2025-07-11 PROCEDURE — D0120: CPT

## 2025-07-11 PROCEDURE — D9310: CPT

## 2025-07-11 PROCEDURE — D1110 PROPHYLAXIS - ADULT: CPT

## 2025-07-11 PROCEDURE — D0330 PANORAMIC RADIOGRAPHIC IMAGE: CPT

## 2025-07-11 PROCEDURE — D0274: CPT

## 2025-07-14 ENCOUNTER — APPOINTMENT (OUTPATIENT)
Facility: CLINIC | Age: 35
End: 2025-07-14

## 2025-07-15 ENCOUNTER — APPOINTMENT (OUTPATIENT)
Dept: ENDOCRINOLOGY | Facility: CLINIC | Age: 35
End: 2025-07-15

## 2025-07-15 VITALS — DIASTOLIC BLOOD PRESSURE: 82 MMHG | HEART RATE: 77 BPM | SYSTOLIC BLOOD PRESSURE: 129 MMHG

## 2025-07-15 PROCEDURE — 96372 THER/PROPH/DIAG INJ SC/IM: CPT

## 2025-07-15 RX ORDER — PASIREOTIDE 40 MG
40 KIT INTRAMUSCULAR
Qty: 0 | Refills: 0 | Status: COMPLETED | OUTPATIENT
Start: 2025-07-15

## 2025-07-17 ENCOUNTER — APPOINTMENT (OUTPATIENT)
Age: 35
End: 2025-07-17
Payer: MEDICAID

## 2025-07-17 PROCEDURE — D1110 PROPHYLAXIS - ADULT: CPT

## 2025-07-17 PROCEDURE — D0270 BITEWING - SINGLE RADIOGRAPHIC IMAGE: CPT

## 2025-07-17 PROCEDURE — D0140: CPT

## 2025-07-24 ENCOUNTER — APPOINTMENT (OUTPATIENT)
Age: 35
End: 2025-07-24
Payer: MEDICAID

## 2025-07-24 PROCEDURE — D9110: CPT

## 2025-08-06 ENCOUNTER — APPOINTMENT (OUTPATIENT)
Dept: ENDOCRINOLOGY | Facility: CLINIC | Age: 35
End: 2025-08-06

## 2025-08-11 ENCOUNTER — APPOINTMENT (OUTPATIENT)
Dept: ENDOCRINOLOGY | Facility: CLINIC | Age: 35
End: 2025-08-11
Payer: MEDICAID

## 2025-08-11 VITALS
SYSTOLIC BLOOD PRESSURE: 137 MMHG | HEART RATE: 95 BPM | WEIGHT: 184 LBS | BODY MASS INDEX: 32.59 KG/M2 | DIASTOLIC BLOOD PRESSURE: 92 MMHG

## 2025-08-11 DIAGNOSIS — E11.9 TYPE 2 DIABETES MELLITUS W/OUT COMPLICATIONS: ICD-10-CM

## 2025-08-11 DIAGNOSIS — E22.0 ACROMEGALY AND PITUITARY GIGANTISM: ICD-10-CM

## 2025-08-11 LAB — GLUCOSE BLDC GLUCOMTR-MCNC: 137

## 2025-08-11 PROCEDURE — 99215 OFFICE O/P EST HI 40 MIN: CPT

## 2025-08-12 ENCOUNTER — NON-APPOINTMENT (OUTPATIENT)
Age: 35
End: 2025-08-12

## 2025-08-14 ENCOUNTER — APPOINTMENT (OUTPATIENT)
Dept: ENDOCRINOLOGY | Facility: CLINIC | Age: 35
End: 2025-08-14
Payer: MEDICAID

## 2025-08-14 PROCEDURE — 96372 THER/PROPH/DIAG INJ SC/IM: CPT

## 2025-08-14 RX ORDER — PASIREOTIDE 40 MG
40 KIT INTRAMUSCULAR
Qty: 0 | Refills: 0 | Status: COMPLETED | OUTPATIENT
Start: 2025-08-13

## 2025-08-20 ENCOUNTER — APPOINTMENT (OUTPATIENT)
Age: 35
End: 2025-08-20
Payer: MEDICAID

## 2025-08-20 PROCEDURE — D3320: CPT

## 2025-08-27 ENCOUNTER — NON-APPOINTMENT (OUTPATIENT)
Age: 35
End: 2025-08-27

## 2025-09-02 ENCOUNTER — APPOINTMENT (OUTPATIENT)
Age: 35
End: 2025-09-02

## 2025-09-04 ENCOUNTER — APPOINTMENT (OUTPATIENT)
Age: 35
End: 2025-09-04
Payer: MEDICAID

## 2025-09-04 PROCEDURE — D2954: CPT

## 2025-09-09 ENCOUNTER — RX RENEWAL (OUTPATIENT)
Age: 35
End: 2025-09-09

## 2025-09-11 ENCOUNTER — APPOINTMENT (OUTPATIENT)
Dept: ENDOCRINOLOGY | Facility: CLINIC | Age: 35
End: 2025-09-11

## 2025-09-17 ENCOUNTER — APPOINTMENT (OUTPATIENT)
Age: 35
End: 2025-09-17
Payer: MEDICAID

## 2025-09-17 PROCEDURE — PIP: CUSTOM

## (undated) DEVICE — DOPPLER PROBE 280MM 20MHZ BAYONETED STERILE

## (undated) DEVICE — DRAPE SURGICAL #1010

## (undated) DEVICE — DRSG MASTISOL

## (undated) DEVICE — DRSG STERISTRIPS 0.5 X 4"

## (undated) DEVICE — SYR LUER LOK 50CC

## (undated) DEVICE — MARKING PEN W RULER

## (undated) DEVICE — DRAPE TOWEL BLUE 17" X 24"

## (undated) DEVICE — DRSG TEGADERM 2.5X3"

## (undated) DEVICE — NDL SPINAL 18G X 3.5" (PINK)

## (undated) DEVICE — CRANIAL MASK TRACKER

## (undated) DEVICE — FOLEY TRAY 16FR 5CC LF UMETER CLOSED

## (undated) DEVICE — SUCTION CATH AIRLIFE CONTROL VALVE TRIFLO 14FR

## (undated) DEVICE — MINI DOPPLER PROBE

## (undated) DEVICE — GLV 7 PROTEXIS (WHITE)

## (undated) DEVICE — DRAPE TOWEL BLUE STICKY

## (undated) DEVICE — STRYKER INSTRUMENT BATTERY

## (undated) DEVICE — Device

## (undated) DEVICE — SOL BALANCE SALT 15ML

## (undated) DEVICE — MIDAS REX MR8 TELESCOPING MATCH HEAD DIAMOND LG BORE 4.5MM X 12CM

## (undated) DEVICE — STAPLER COVIDIEN SKIN APPOSE 35

## (undated) DEVICE — DRSG TEGADERM 1.75X1.75"

## (undated) DEVICE — STAPLER SKIN PROXIMATE

## (undated) DEVICE — ELCTR BOVIE SUCTION 8FR 6"

## (undated) DEVICE — SUT MONOCRYL 4-0 27" PS-2 UNDYED

## (undated) DEVICE — ELCTR STRYKER NEPTUNE SMOKE EVACUATION PENCIL (GREEN)

## (undated) DEVICE — SYR BULB 2OZ (BLUE)

## (undated) DEVICE — MIDAS REX MR8 TELESCOPING MATCH HEAD DIAMOND LG BORE 3.5MM X 12CM

## (undated) DEVICE — MIDAS REX MR7 LUBRICANT DIFFUSER CARTRIDGE

## (undated) DEVICE — POSITIONER FOAM HEAD DONUT 9" (PINK)

## (undated) DEVICE — DRSG MEROCEL STANDARD W STRING 8CM

## (undated) DEVICE — TUBING SUCTION 20FT

## (undated) DEVICE — GLV 8 PROTEXIS (WHITE)

## (undated) DEVICE — PREP BETADINE KIT

## (undated) DEVICE — GOWN XL

## (undated) DEVICE — TUBING STRYKER SET AND EXTENDER FILTER TUBING

## (undated) DEVICE — BLADE MEDTRONIC ENT QUADCUT ROTATABLE STRAIGHT 4MM X 13CM

## (undated) DEVICE — SPECIMEN CONTAINER 100ML

## (undated) DEVICE — TIP UNIV SUPERLONG STR

## (undated) DEVICE — DRAPE MAGNETIC INSTRUMENT MEDIUM

## (undated) DEVICE — SUT VICRYL 3-0 27" SH UNDYED

## (undated) DEVICE — SUT PROLENE 2-0 30" CT-2

## (undated) DEVICE — DRAPE LIGHT HANDLE COVER (GREEN)

## (undated) DEVICE — GOWN ROYAL SILK XL

## (undated) DEVICE — SOL ANTI FOG

## (undated) DEVICE — STORZ DURA MICRO KNIFE INSERT SICKLE-SHAPED

## (undated) DEVICE — SYR ASEPTO

## (undated) DEVICE — PACK UPPER BODY

## (undated) DEVICE — ELCTR HOLSTER ACCESSORY

## (undated) DEVICE — PACKING NASAL STD 1.5X4.5X2CM

## (undated) DEVICE — GLV 8.5 PROTEXIS (WHITE)

## (undated) DEVICE — SUT PLAIN GUT 4-0 18" SC-1

## (undated) DEVICE — NDL ELECTRODE ULTRACLEAN 6

## (undated) DEVICE — DRAPE INSTRUMENT POUCH 6.75" X 11"

## (undated) DEVICE — TAPE SILK 3"